# Patient Record
Sex: FEMALE | Race: WHITE | NOT HISPANIC OR LATINO | Employment: STUDENT | URBAN - METROPOLITAN AREA
[De-identification: names, ages, dates, MRNs, and addresses within clinical notes are randomized per-mention and may not be internally consistent; named-entity substitution may affect disease eponyms.]

---
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
## 2020-01-20 ENCOUNTER — OFFICE VISIT - HEALTHEAST (OUTPATIENT)
Dept: FAMILY MEDICINE | Facility: CLINIC | Age: 20
End: 2020-01-20

## 2020-01-20 ENCOUNTER — COMMUNICATION - HEALTHEAST (OUTPATIENT)
Dept: SCHEDULING | Facility: CLINIC | Age: 20
End: 2020-01-20

## 2020-01-20 DIAGNOSIS — R05.9 COUGH: ICD-10-CM

## 2020-01-20 DIAGNOSIS — L70.9 ACNE, UNSPECIFIED ACNE TYPE: ICD-10-CM

## 2020-02-22 ENCOUNTER — COMMUNICATION - HEALTHEAST (OUTPATIENT)
Dept: FAMILY MEDICINE | Facility: CLINIC | Age: 20
End: 2020-02-22

## 2020-03-31 ENCOUNTER — COMMUNICATION - HEALTHEAST (OUTPATIENT)
Dept: FAMILY MEDICINE | Facility: CLINIC | Age: 20
End: 2020-03-31

## 2020-03-31 DIAGNOSIS — Z30.015 ENCOUNTER FOR INITIAL PRESCRIPTION OF VAGINAL RING HORMONAL CONTRACEPTIVE: ICD-10-CM

## 2020-05-29 ENCOUNTER — COMMUNICATION - HEALTHEAST (OUTPATIENT)
Dept: FAMILY MEDICINE | Facility: CLINIC | Age: 20
End: 2020-05-29

## 2020-06-24 ENCOUNTER — OFFICE VISIT - HEALTHEAST (OUTPATIENT)
Dept: FAMILY MEDICINE | Facility: CLINIC | Age: 20
End: 2020-06-24

## 2020-06-24 ENCOUNTER — COMMUNICATION - HEALTHEAST (OUTPATIENT)
Dept: FAMILY MEDICINE | Facility: CLINIC | Age: 20
End: 2020-06-24

## 2020-06-24 DIAGNOSIS — H10.33 ACUTE BACTERIAL CONJUNCTIVITIS OF BOTH EYES: ICD-10-CM

## 2020-06-27 ENCOUNTER — COMMUNICATION - HEALTHEAST (OUTPATIENT)
Dept: FAMILY MEDICINE | Facility: CLINIC | Age: 20
End: 2020-06-27

## 2020-07-01 ENCOUNTER — COMMUNICATION - HEALTHEAST (OUTPATIENT)
Dept: FAMILY MEDICINE | Facility: CLINIC | Age: 20
End: 2020-07-01

## 2020-07-01 DIAGNOSIS — L70.9 ACNE: ICD-10-CM

## 2020-07-31 ENCOUNTER — COMMUNICATION - HEALTHEAST (OUTPATIENT)
Dept: FAMILY MEDICINE | Facility: CLINIC | Age: 20
End: 2020-07-31

## 2020-07-31 DIAGNOSIS — J30.9 ALLERGIC RHINITIS: ICD-10-CM

## 2020-08-24 ENCOUNTER — COMMUNICATION - HEALTHEAST (OUTPATIENT)
Dept: FAMILY MEDICINE | Facility: CLINIC | Age: 20
End: 2020-08-24

## 2020-09-23 ENCOUNTER — COMMUNICATION - HEALTHEAST (OUTPATIENT)
Dept: FAMILY MEDICINE | Facility: CLINIC | Age: 20
End: 2020-09-23

## 2020-09-23 DIAGNOSIS — L70.9 ACNE: ICD-10-CM

## 2020-11-27 ENCOUNTER — COMMUNICATION - HEALTHEAST (OUTPATIENT)
Dept: FAMILY MEDICINE | Facility: CLINIC | Age: 20
End: 2020-11-27

## 2020-11-27 DIAGNOSIS — R05.9 COUGH: ICD-10-CM

## 2020-12-24 ENCOUNTER — COMMUNICATION - HEALTHEAST (OUTPATIENT)
Dept: FAMILY MEDICINE | Facility: CLINIC | Age: 20
End: 2020-12-24

## 2021-01-22 ENCOUNTER — OFFICE VISIT (OUTPATIENT)
Dept: FAMILY MEDICINE | Facility: CLINIC | Age: 21
End: 2021-01-22
Payer: COMMERCIAL

## 2021-01-22 VITALS
HEIGHT: 63 IN | HEART RATE: 94 BPM | RESPIRATION RATE: 16 BRPM | SYSTOLIC BLOOD PRESSURE: 100 MMHG | WEIGHT: 129 LBS | OXYGEN SATURATION: 99 % | TEMPERATURE: 98 F | DIASTOLIC BLOOD PRESSURE: 68 MMHG | BODY MASS INDEX: 22.86 KG/M2

## 2021-01-22 DIAGNOSIS — Z23 NEED FOR PROPHYLACTIC VACCINATION AND INOCULATION AGAINST INFLUENZA: ICD-10-CM

## 2021-01-22 DIAGNOSIS — L70.9 ACNE, UNSPECIFIED ACNE TYPE: ICD-10-CM

## 2021-01-22 DIAGNOSIS — R06.02 SHORTNESS OF BREATH: Primary | ICD-10-CM

## 2021-01-22 PROCEDURE — 99214 OFFICE O/P EST MOD 30 MIN: CPT | Mod: 25 | Performed by: FAMILY MEDICINE

## 2021-01-22 PROCEDURE — 90686 IIV4 VACC NO PRSV 0.5 ML IM: CPT | Performed by: FAMILY MEDICINE

## 2021-01-22 PROCEDURE — 90471 IMMUNIZATION ADMIN: CPT | Performed by: FAMILY MEDICINE

## 2021-01-22 RX ORDER — TRETINOIN 0.5 MG/G
CREAM TOPICAL
COMMUNITY
Start: 2020-01-20 | End: 2022-04-01

## 2021-01-22 RX ORDER — ALBUTEROL SULFATE 90 UG/1
AEROSOL, METERED RESPIRATORY (INHALATION)
COMMUNITY
Start: 2020-12-05 | End: 2021-08-03

## 2021-01-22 RX ORDER — CLINDAMYCIN PHOSPHATE 10 UG/ML
LOTION TOPICAL
COMMUNITY
Start: 2020-11-01 | End: 2021-01-22

## 2021-01-22 RX ORDER — CLINDAMYCIN PHOSPHATE 10 UG/ML
LOTION TOPICAL
Qty: 60 ML | Refills: 1 | Status: SHIPPED | OUTPATIENT
Start: 2021-01-22 | End: 2021-06-08

## 2021-01-22 RX ORDER — FLUTICASONE PROPIONATE 50 MCG
SPRAY, SUSPENSION (ML) NASAL
COMMUNITY
Start: 2021-01-08 | End: 2021-11-22

## 2021-01-22 RX ORDER — ETONOGESTREL AND ETHINYL ESTRADIOL VAGINAL RING .015; .12 MG/D; MG/D
RING VAGINAL
COMMUNITY
Start: 2020-11-20 | End: 2021-12-27

## 2021-01-22 ASSESSMENT — ANXIETY QUESTIONNAIRES
IF YOU CHECKED OFF ANY PROBLEMS ON THIS QUESTIONNAIRE, HOW DIFFICULT HAVE THESE PROBLEMS MADE IT FOR YOU TO DO YOUR WORK, TAKE CARE OF THINGS AT HOME, OR GET ALONG WITH OTHER PEOPLE: SOMEWHAT DIFFICULT
5. BEING SO RESTLESS THAT IT IS HARD TO SIT STILL: SEVERAL DAYS
1. FEELING NERVOUS, ANXIOUS, OR ON EDGE: SEVERAL DAYS
GAD7 TOTAL SCORE: 7
3. WORRYING TOO MUCH ABOUT DIFFERENT THINGS: SEVERAL DAYS
2. NOT BEING ABLE TO STOP OR CONTROL WORRYING: SEVERAL DAYS
6. BECOMING EASILY ANNOYED OR IRRITABLE: SEVERAL DAYS
7. FEELING AFRAID AS IF SOMETHING AWFUL MIGHT HAPPEN: NOT AT ALL

## 2021-01-22 ASSESSMENT — PATIENT HEALTH QUESTIONNAIRE - PHQ9
SUM OF ALL RESPONSES TO PHQ QUESTIONS 1-9: 3
5. POOR APPETITE OR OVEREATING: MORE THAN HALF THE DAYS

## 2021-01-22 ASSESSMENT — MIFFLIN-ST. JEOR: SCORE: 1316.33

## 2021-01-22 NOTE — PROGRESS NOTES
Assessment/Plan:    Maye Fernandez is a 20 year old female presenting for:    Shortness of breath  Post Covid.  Is assumed that this will improve with time however I have sent Advair to the pharmacy.  I would like her to take this twice daily for 1 month.  After that she can discontinue and see how she is feeling.  Continue using albuterol as needed.  - fluticasone-salmeterol (ADVAIR) 250-50 MCG/DOSE inhaler  Dispense: 60 each; Refill: 1    Acne, unspecified acne type  Refill clindamycin sent to the pharmacy  - clindamycin (CLEOCIN T) 1 % external lotion  Dispense: 60 mL; Refill: 1    Need for prophylactic vaccination and inoculation against influenza  - INFLUENZA VACCINE IM > 6 MONTHS VALENT IIV4 [92299]      Medications Discontinued During This Encounter   Medication Reason     buPROPion (WELLBUTRIN XL) 150 MG 24 hr tablet      clindamycin (CLEOCIN T) 1 % external lotion Reorder           Chief Complaint:  Covid Concern and Imm/Inj        Subjective:   Maye Fernandez is a pleasant 20-year-old female presenting to the clinic today for follow-up of COVID-19.  Patient was diagnosed with COVID-19 in late November.  She states that she had moderate symptoms with some shortness of breath, body aches and headaches.  Symptoms lasted for about 5 days and abated.  Since that time she has been continuing to have some shortness of breath.  She notes that occasionally she feels as though she is wheezing.  She is decreased exercise tolerance as well.  She denies any chest pain or lower extremity swelling.    She is eating and drinking well.  She is sleeping well.    She notes that she has been having some increased anxiety recently.      12 point review of systems completed and negative except for what has been described above    History   Smoking Status     Never Smoker   Smokeless Tobacco     Never Used         Current Outpatient Medications:      clindamycin (CLEOCIN T) 1 % external lotion, ADILENE EXT TO FACE BID,  "Disp: 60 mL, Rfl: 1     etonogestrel-ethinyl estradiol (NUVARING) 0.12-0.015 MG/24HR vaginal ring, , Disp: , Rfl:      fluticasone (FLONASE) 50 MCG/ACT nasal spray, , Disp: , Rfl:      fluticasone-salmeterol (ADVAIR) 250-50 MCG/DOSE inhaler, Inhale 1 puff into the lungs every 12 hours, Disp: 60 each, Rfl: 1     sulfacetamide sodium-sulfur 10-5 % EMUL, One application daily, Disp: , Rfl:      tretinoin (RETIN-A) 0.05 % external cream, , Disp: , Rfl:      VENTOLIN  (90 Base) MCG/ACT inhaler, INL 2 PFS PO Q 6 H PRF WHZ, Disp: , Rfl:       Objective:  Vitals:    01/22/21 1347   BP: 100/68   Pulse: 94   Resp: 16   Temp: 98  F (36.7  C)   TempSrc: Tympanic   SpO2: 99%   Weight: 58.5 kg (129 lb)   Height: 1.588 m (5' 2.5\")       Body mass index is 23.22 kg/m .    Vital signs reviewed and stable  General: No acute distress  Psych: Appropriate affect  HEENT: moist mucous membranes, pupils equal, round, reactive to light and accomodation, tympanic membranes are pearly grey bilaterally  Lymph: no cervical or supraclavicular lymphadenopathy  Cardiovascular: regular rate and rhythm with no murmur  Pulmonary: clear to auscultation bilaterally with no wheeze  Abdomen: soft, non tender, non distended with normo-active bowel sounds  Extremities: warm and well perfused with no edema  Skin: warm and dry with no rash         This note has been dictated and transcribed using voice recognition software.   Any errors in transcription are unintentional and inherent to the software.  "

## 2021-01-23 ASSESSMENT — ANXIETY QUESTIONNAIRES: GAD7 TOTAL SCORE: 7

## 2021-01-31 ENCOUNTER — COMMUNICATION - HEALTHEAST (OUTPATIENT)
Dept: FAMILY MEDICINE | Facility: CLINIC | Age: 21
End: 2021-01-31

## 2021-01-31 DIAGNOSIS — L70.9 ACNE, UNSPECIFIED ACNE TYPE: ICD-10-CM

## 2021-02-01 ENCOUNTER — COMMUNICATION - HEALTHEAST (OUTPATIENT)
Dept: FAMILY MEDICINE | Facility: CLINIC | Age: 21
End: 2021-02-01

## 2021-02-01 DIAGNOSIS — Z30.015 ENCOUNTER FOR INITIAL PRESCRIPTION OF VAGINAL RING HORMONAL CONTRACEPTIVE: ICD-10-CM

## 2021-02-28 ENCOUNTER — HEALTH MAINTENANCE LETTER (OUTPATIENT)
Age: 21
End: 2021-02-28

## 2021-02-28 ASSESSMENT — ENCOUNTER SYMPTOMS
COUGH: 0
DIARRHEA: 0
EYE PAIN: 0
PARESTHESIAS: 0
NAUSEA: 0
SORE THROAT: 0
HEARTBURN: 0
NERVOUS/ANXIOUS: 0
SHORTNESS OF BREATH: 0
ABDOMINAL PAIN: 0
WEAKNESS: 0
HEADACHES: 0
HEMATURIA: 0
JOINT SWELLING: 0
DIZZINESS: 0
BREAST MASS: 0
MYALGIAS: 0
HEMATOCHEZIA: 0
FEVER: 0
ARTHRALGIAS: 0
CONSTIPATION: 0
FREQUENCY: 0
DYSURIA: 0
CHILLS: 0
PALPITATIONS: 0

## 2021-03-01 ENCOUNTER — ANCILLARY PROCEDURE (OUTPATIENT)
Dept: GENERAL RADIOLOGY | Facility: CLINIC | Age: 21
End: 2021-03-01
Attending: FAMILY MEDICINE
Payer: COMMERCIAL

## 2021-03-01 ENCOUNTER — OFFICE VISIT (OUTPATIENT)
Dept: FAMILY MEDICINE | Facility: CLINIC | Age: 21
End: 2021-03-01
Payer: COMMERCIAL

## 2021-03-01 VITALS
HEIGHT: 63 IN | SYSTOLIC BLOOD PRESSURE: 122 MMHG | BODY MASS INDEX: 23.59 KG/M2 | TEMPERATURE: 99.3 F | RESPIRATION RATE: 16 BRPM | DIASTOLIC BLOOD PRESSURE: 78 MMHG | HEART RATE: 76 BPM | WEIGHT: 133.13 LBS

## 2021-03-01 DIAGNOSIS — M79.675 PAIN OF TOE OF LEFT FOOT: ICD-10-CM

## 2021-03-01 DIAGNOSIS — B37.0 THRUSH: ICD-10-CM

## 2021-03-01 DIAGNOSIS — R06.02 SHORTNESS OF BREATH: ICD-10-CM

## 2021-03-01 DIAGNOSIS — Z86.16 HISTORY OF COVID-19: ICD-10-CM

## 2021-03-01 DIAGNOSIS — Z00.00 HEALTHCARE MAINTENANCE: Primary | ICD-10-CM

## 2021-03-01 PROCEDURE — 71046 X-RAY EXAM CHEST 2 VIEWS: CPT | Mod: FY | Performed by: RADIOLOGY

## 2021-03-01 PROCEDURE — 99213 OFFICE O/P EST LOW 20 MIN: CPT | Mod: 25 | Performed by: FAMILY MEDICINE

## 2021-03-01 PROCEDURE — 99395 PREV VISIT EST AGE 18-39: CPT | Performed by: FAMILY MEDICINE

## 2021-03-01 PROCEDURE — G0145 SCR C/V CYTO,THINLAYER,RESCR: HCPCS | Performed by: FAMILY MEDICINE

## 2021-03-01 RX ORDER — NYSTATIN 100000/ML
500000 SUSPENSION, ORAL (FINAL DOSE FORM) ORAL 4 TIMES DAILY
Qty: 60 ML | Refills: 0 | Status: SHIPPED | OUTPATIENT
Start: 2021-03-01 | End: 2021-12-02

## 2021-03-01 ASSESSMENT — MIFFLIN-ST. JEOR: SCORE: 1330.04

## 2021-03-01 NOTE — PROGRESS NOTES
Assessment/Plan:     Health maintenance female exam.  All questions answered.  Await pap smear results.  Breast self exam technique reviewed and patient encouraged to perform self-exam monthly.  Discussed healthy lifestyle modifications.      Healthcare maintenance  - Obtaining, preparing and conveyance of cervical or vaginal smear to laboratory.  - Pap imaged thin layer screen only - recommended age 21 - 24 years    History of COVID-19  With her history of COVID-19 several months ago and continued shortness of breath I will get an x-ray today to ensure that we are not missing anything.  She did benefit from Advair some could consider that if she would like.  - XR Chest 2 Views    Shortness of breath  - XR Chest 2 Views    Thrush  Unclear if this is the reason she is having a foul taste in her mouth.  Nystatin sent to the pharmacy which she can do swish and swallow.  - nystatin (MYCOSTATIN) 935015 UNIT/ML suspension  Dispense: 60 mL; Refill: 0    Pain of toe of left foot  This was not fully evaluated today due to time constraints.  Referral to podiatry placed.  - [facility]  Referral              Subjective:     Maye Fernandez is a 21 year old female who presents for an annual exam.  She is overall doing fairly well.  She has a few questions or concerns she would like to discuss today.    1.  Shortness of breath: Patient has a history of COVID-19 several months ago.  She lost her taste and smell when she had this and also had some shortness of breath.  She noticed that when she exerts herself she will feel short of breath still.  She was started on Advair which she states was helpful.  She used this for 30 days now has been off of it for a few days.  She is unsure if she is becoming more short of breath being off the medication or not.  She does have albuterol which she uses occasionally as well.      2. Taste disturbance : Patient is unsure if this is due to COVID-19 but she states that  her taste was coming back.  Everything seems somewhat blunted but now she states that everything taste foul.  She states that this is a new symptom over the last few days.  She is having difficult time eating because things do not taste good.    #3.  Toe pain: Patient notes that she believes she broke her left great toe when she was much younger.  It has been painful for her recently.  She is hopeful to see a specialist for this.      Healthy Habits:   Regular Exercise: Yes  Sunscreen Use: Yes  Healthy Diet: yes  Dental Visits Regularly: yes  Seat Belt: Yes  Self Breast Exam Monthly: yes  Prevention of Osteoporosis: yes    Immunization History   Administered Date(s) Administered     Comvax (HIB/HepB) 2000, 2000, 02/14/2001     DTAP (<7y) 2000, 2000, 2000, 05/11/2001, 02/02/2005     HPV9 02/11/2019     HepA-Adult 03/10/2011, 09/14/2011     Influenza Vaccine IM > 6 months Valent IIV4 01/22/2021     MMR 02/14/2001, 02/02/2005     Meningococcal (Menactra ) 03/10/2011     Pneumococcal (PCV 7) 2000, 2000, 2000, 05/11/2001     Poliovirus, inactivated (IPV) 2000, 2000, 2000, 2000, 02/24/2004     TDAP Vaccine (Boostrix) 03/10/2011     Varicella 02/14/2001, 03/10/2011         Gynecologic History  Patient's last menstrual period was 02/21/2021 (exact date).  Contraception: Nuva ring  Last Pap: n/a.        OB History   No obstetric history on file.       Current Outpatient Medications   Medication Sig Dispense Refill     clindamycin (CLEOCIN T) 1 % external lotion ADILENE EXT TO FACE BID 60 mL 1     etonogestrel-ethinyl estradiol (NUVARING) 0.12-0.015 MG/24HR vaginal ring        fluticasone (FLONASE) 50 MCG/ACT nasal spray        nystatin (MYCOSTATIN) 332781 UNIT/ML suspension Take 5 mLs (500,000 Units) by mouth 4 times daily Swish and spit OK 60 mL 0     sulfacetamide sodium-sulfur 10-5 % EMUL One application daily       tretinoin (RETIN-A) 0.05 % external  "cream        VENTOLIN  (90 Base) MCG/ACT inhaler INL 2 PFS PO Q 6 H PRF WHZ       Past Medical History:   Diagnosis Date     Deliberate self-cutting      No past surgical history on file.  Patient has no known allergies.  No family history on file.  Social History     Socioeconomic History     Marital status: Single     Spouse name: Not on file     Number of children: Not on file     Years of education: Not on file     Highest education level: Not on file   Occupational History     Not on file   Social Needs     Financial resource strain: Not on file     Food insecurity     Worry: Not on file     Inability: Not on file     Transportation needs     Medical: Not on file     Non-medical: Not on file   Tobacco Use     Smoking status: Never Smoker     Smokeless tobacco: Never Used   Substance and Sexual Activity     Alcohol use: No     Drug use: Not on file     Sexual activity: Not on file   Lifestyle     Physical activity     Days per week: Not on file     Minutes per session: Not on file     Stress: Not on file   Relationships     Social connections     Talks on phone: Not on file     Gets together: Not on file     Attends [employer] service: Not on file     Active member of club or organization: Not on file     Attends meetings of clubs or organizations: Not on file     Relationship status: Not on file     Intimate partner violence     Fear of current or ex partner: Not on file     Emotionally abused: Not on file     Physically abused: Not on file     Forced sexual activity: Not on file   Other Topics Concern     Not on file   Social History Narrative     Not on file       Review of Systems  12 point review of systems was completed and found to be negative except for what is been stated above.      Objective:      Vitals:    03/01/21 1325   BP: 122/78   Pulse: 76   Resp: 16   Temp: 99.3  F (37.4  C)   TempSrc: Tympanic   Weight: 60.4 kg (133 lb 2 oz)   Height: 1.588 m (5' 2.5\")         Physical Exam:  General " Appearance: Alert, cooperative, no distress, appears stated age   Head: Normocephalic, without obvious abnormality, atraumatic  Eyes: PERRL, conjunctiva/corneas clear, EOM's intact   Ears: Normal TM's and external ear canals, both ears  Nose:Nares normal, septum midline,mucosa normal, no drainage    Throat:Lips, mucosa, and tongue normal; thin white coating over tongue teeth and gums normal  Neck: Supple, symmetrical, trachea midline, no adenopathy;  thyroid: not enlarged, symmetric, no tenderness/mass/nodules  Back: Symmetric, no curvature, ROM normal,  Lungs: Clear to auscultation bilaterally, respirations unlabored  Breasts: No breast masses, tenderness, asymmetry, or nipple discharge.  Heart: Regular rate and rhythm, S1 and S2 normal, no murmur, rub, or gallop  Abdomen: Soft, non-tender, bowel sounds active all four quadrants,  no masses, no organomegaly  Pelvic:normal external female genitalia, normal appearing vaginal mucosa and cervix  Extremities: Extremities normal, atraumatic, no cyanosis or edema  Skin: Skin color, texture, turgor normal, no rashes or lesions  Lymph nodes: Cervical, supraclavicular, and axillary nodes normal and   Neurologic: Normal       Answers for HPI/ROS submitted by the patient on 2/28/2021   Annual Exam:  Frequency of exercise:: None  Getting at least 3 servings of Calcium per day:: Yes  Diet:: Regular (no restrictions)  Taking medications regularly:: Yes  Medication side effects:: None  Bi-annual eye exam:: NO  Dental care twice a year:: NO  Sleep apnea or symptoms of sleep apnea:: None  abdominal pain: No  Blood in stool: No  Blood in urine: No  chest pain: No  chills: No  congestion: No  constipation: No  cough: No  diarrhea: No  dizziness: No  ear pain: No  eye pain: No  nervous/anxious: No  fever: No  frequency: No  genital sores: No  headaches: No  hearing loss: No  heartburn: No  arthralgias: No  joint swelling: No  peripheral edema: No  mood changes: No  myalgias:  No  nausea: No  dysuria: No  palpitations: No  Skin sensation changes: No  sore throat: No  urgency: No  rash: No  shortness of breath: No  visual disturbance: No  weakness: No  pelvic pain: No  vaginal bleeding: No  vaginal discharge: No  tenderness: No  breast mass: No  breast discharge: No  Additional concerns today:: Yes

## 2021-03-03 LAB
COPATH REPORT: NORMAL
PAP: NORMAL

## 2021-03-08 ENCOUNTER — OFFICE VISIT (OUTPATIENT)
Dept: PODIATRY | Facility: CLINIC | Age: 21
End: 2021-03-08
Payer: COMMERCIAL

## 2021-03-08 ENCOUNTER — ANCILLARY PROCEDURE (OUTPATIENT)
Dept: GENERAL RADIOLOGY | Facility: CLINIC | Age: 21
End: 2021-03-08
Attending: PODIATRIST
Payer: COMMERCIAL

## 2021-03-08 DIAGNOSIS — M79.672 LEFT FOOT PAIN: Primary | ICD-10-CM

## 2021-03-08 DIAGNOSIS — M79.672 LEFT FOOT PAIN: ICD-10-CM

## 2021-03-08 DIAGNOSIS — M77.52 CAPSULITIS OF METATARSOPHALANGEAL (MTP) JOINT OF LEFT FOOT: ICD-10-CM

## 2021-03-08 PROCEDURE — 99203 OFFICE O/P NEW LOW 30 MIN: CPT | Performed by: PODIATRIST

## 2021-03-08 PROCEDURE — 73630 X-RAY EXAM OF FOOT: CPT | Mod: LT | Performed by: RADIOLOGY

## 2021-03-08 NOTE — PATIENT INSTRUCTIONS
Next Steps:      1. Support:  a. Wear supportive shoes, sandals, boots and/or inserts that have a rigid supportive sole.    i. This will offload the majority of tension forces that travel through your feet every step you take.    b. It is important that you also wear supportive shoe wear in the house to continue providing support to your feet.    c. You may always use a cushioned liner for your shoes if that makes your feet feel better.  2. Stretching  a. Calf stretching is essential to offload the tension forces that travel through your feet every step you take  b. Preferred calf stretch is the Runner's Stretch  i. Place one foot behind the other foot, flat against the ground (it is important to keep the heel on the ground).  The back leg is the one that will be stretched.  1. Start with the knee straight and lean your hips into the wall, counter or whatever you are leaning into - count to ten.  2. Next, bend the knee.  You should feel the stretch lower in the calf muscle - count to ten.  c. Repeat this stretch once an hour to start off with.  When symptoms subside, I recommend performing the stretch 3 times daily to prevent any future problems.  3. Tissue Massage  a. It is important that you physically loosen the inflammation tissue to help your body heal the injured tissue.  b. I recommend soaking your foot in warm water to increase the microcirculation to the soft tissues.  You may add Epson salt to the water if you prefer.  c. You may apply an over-the-counter muscle rub, such as Voltaren gel, and deeply massage the injured tissue.  4. Reduce Inflammation  a. You can ice the injured tissue with an ice pack with a light cloth covering or soaking in ice water 20 minutes to reduce any acute inflammation, typically at the end of the day.  b. If tolerated, you may take a Non-Steroidal Antiinflammatory medication (NSAID), such as Advil or Aleve, to help reduce the inflammation tissue.  This can help the overall  healing of the injured tissue.  i. It is important to take food with any NSAID medication as the most common side effect is stomach irritation.  If you encounter any problems when taking NSAID, it is recommended that you stop taking the medication and notify your provider.    It is important to understand that most problems that develop in the foot and ankle are caused by excessive tension that cause microinjury to the soft tissues and inflammation in the foot and ankle.  By addressing the underlying causes with support and stretching as well as treating the current inflammatory conditions with tissue massage and anti-inflammatory treatments, most foot and ankle musculoskeletal conditions will resolve.  This may take time to heal.  However, if symptoms persist past 4 weeks you should return to the office for reevaluation to determine further treatment options.

## 2021-03-08 NOTE — LETTER
3/8/2021         RE: Maye Fernandez  [id number] [facility] [id number]        Dear Colleague,    Thank you for referring your patient, Maye Fernandez, to the [facility] [place]. Please see a copy of my visit note below.    PATIENT HISTORY:  Maye Fernandez is a 21 year old female who presents to clinic with a chief complaint of a painful ball of the left foot.  The patient relates that the problem has been going on for several weeks and is getting worse.  The patient relates trying different shoes with little relief.   The patient was referred by Dr. Amezquita for consultation on the left foot.  Any previous notes and studies that pertain to the patient's condition were reviewed.    Pertinent medical, surgical and family history was reviewed in Epic chart.    Medications:   Current Outpatient Medications:      clindamycin (CLEOCIN T) 1 % external lotion, ADILENE EXT TO FACE BID, Disp: 60 mL, Rfl: 1     etonogestrel-ethinyl estradiol (NUVARING) 0.12-0.015 MG/24HR vaginal ring, , Disp: , Rfl:      fluticasone (FLONASE) 50 MCG/ACT nasal spray, , Disp: , Rfl:      nystatin (MYCOSTATIN) [id number] UNIT/ML suspension, Take 5 mLs (500,000 Units) by mouth 4 times daily Swish and spit OK, Disp: 60 mL, Rfl: 0     sulfacetamide sodium-sulfur 10-5 % EMUL, One application daily, Disp: , Rfl:      tretinoin (RETIN-A) 0.05 % external cream, , Disp: , Rfl:      VENTOLIN  (90 Base) MCG/ACT inhaler, INL 2 PFS PO Q 6 H PRF WHZ, Disp: , Rfl:      Allergies:  No Known Allergies    Vitals: [facility] 02/21/2021 (Exact Date)   BMI= There is no height or weight on file to calculate BMI.    LOWER EXTREMITY PHYSICAL EXAM    Dermatologic: Skin is intact to left lower extremities without significant lesions, rash or abrasion.        Vascular: DP & PT pulses are intact & regular on the left.   CFT and skin temperature is normal to the left lower extremities.     Neurologic: Lower extremity sensation is  intact to light touch.  No evidence of weakness in the left lower extremities.        Musculoskeletal: Patient is ambulatory without assistive device or brace.  No gross ankle deformity noted.  No foot or ankle joint effusion is noted.  Noted pain on palpation on the plantar aspect of the metatarsophalangeal joint on the left foot.  No surrounding erythema noted.  No ecchymosis noted.    Diagnostics:  Radiographs were evaluated including weightbearing AP, lateral and medial oblique views of the left foot reveals  no cortical erosions or periosteal elevation.  All joint margins appear stable.  There is no apparent fracture or tumor formation noted.  There is no evidence of foreign body.  The images were reviewed with the patient explaining the findings.      ASSESSMENT / PLAN:     ICD-10-CM    1. Left foot pain  M79.672 XR Foot Left G/E 3 Views       I have explained to Maye about the conditions.  We discussed the underlying contributing factors of the condition as well as both conservative and surgical treatment options along with expected length of recovery.  At this time, the patient was instructed on icing, stretching, tissue massage and support.  The patient was fitted with a Dynaflex insert that will aid in offloading the tension forces to the soft tissues and prevent further inflammation.    The patient will return in four weeks for reevaluation if the symptoms do not resolve.      Maye verbalized agreement with and understanding of the rational for the diagnosis and treatment plan.  All questions were answered to best of my ability and the patient's satisfaction. The patient was advised to contact the clinic with any questions that may arise after the clinic visit.      Disclaimer: This note consists of symbols derived from keyboarding, dictation and/or voice recognition software. As a result, there may be errors in the script that have gone undetected. Please consider this when interpreting  information found in this chart.       ADRIANA Amador D.P.M., [email or web address].        Again, thank you for allowing me to participate in the care of your patient.        Sincerely,        Carrillo Amador DPM

## 2021-05-28 NOTE — TELEPHONE ENCOUNTER
Refill Approved    Rx renewed per Medication Renewal Policy. Medication was last renewed on 9/14/17.    Ashley García, [facility] Triage/Med Refill 5/14/2019     Requested Prescriptions   Pending Prescriptions Disp Refills     fluticasone propionate (FLONASE) 50 mcg/actuation nasal spray 16 g 0     Sig: USE 2 SPRAYS IN EACH NOSTRIL ONCE DAILY       Nasal Steroid Refill Protocol Passed - 5/13/2019 10:42 AM        Passed - Patient has had office visit/physical in last 2 years     Last office visit with prescriber/PCP: 2/11/2019 OR same dept: 2/11/2019 Lashonda Amezquita MD OR same specialty: 2/11/2019 Lashonda Amezquita MD Last physical: Visit date not found Last MTM visit: Visit date not found    Next appt within 3 mo: Visit date not found  Next physical within 3 mo: Visit date not found  Prescriber OR PCP: Lashonda Amezquita MD  Last diagnosis associated with med order: 1. Allergic rhinitis  - fluticasone propionate (FLONASE) 50 mcg/actuation nasal spray; USE 2 SPRAYS IN EACH NOSTRIL ONCE DAILY  Dispense: 16 g; Refill: 0     If protocol passes may refill for 12 months if within 3 months of last provider visit (or a total of 15 months).

## 2021-05-30 VITALS — BODY MASS INDEX: 21.75 KG/M2 | WEIGHT: 118.19 LBS | HEIGHT: 62 IN

## 2021-05-30 NOTE — TELEPHONE ENCOUNTER
RN cannot approve Refill Request    RN can NOT refill this medication med is not covered by policy/route to provider       . Last office visit: 2/11/2019 Lashonda Amezquita MD Last Physical: Visit date not found Last MTM visit: Visit date not found Last visit same specialty: 2/11/2019 Lashonda Amezquita MD.  Next visit within 3 mo: Visit date not found  Next physical within 3 mo: Visit date not found      Ashley García, [facility] Triage/Med Refill 7/30/2019    Requested Prescriptions   Pending Prescriptions Disp Refills     sulfacetamide sodium-sulfur 10-5 % (w/w) Clsr 340 g 1     Sig: One application daily       There is no refill protocol information for this order

## 2021-05-31 VITALS — BODY MASS INDEX: 22.15 KG/M2 | WEIGHT: 120.38 LBS | HEIGHT: 62 IN

## 2021-05-31 VITALS — WEIGHT: 113.38 LBS | BODY MASS INDEX: 20.09 KG/M2 | HEIGHT: 63 IN

## 2021-06-02 VITALS — HEIGHT: 63 IN | BODY MASS INDEX: 22.05 KG/M2 | WEIGHT: 124.44 LBS

## 2021-06-02 NOTE — TELEPHONE ENCOUNTER
RN cannot approve Refill Request    RN can NOT refill this medication med is not covered by policy/route to provider     . Last office visit: 2/11/2019 Lashonda Amezquita MD Last Physical: Visit date not found Last MTM visit: Visit date not found Last visit same specialty: 2/11/2019 Lashonda Amezquita MD.  Next visit within 3 mo: Visit date not found  Next physical within 3 mo: Visit date not found      Ashley García, [facility] Triage/Med Refill 10/24/2019    Requested Prescriptions   Pending Prescriptions Disp Refills     clindamycin (CLEOCIN T) 1 % lotion 60 mL 2     Sig: APPLY EXTERNALLY TO FACE EVERY DAY       There is no refill protocol information for this order

## 2021-06-04 VITALS
HEART RATE: 72 BPM | DIASTOLIC BLOOD PRESSURE: 72 MMHG | RESPIRATION RATE: 16 BRPM | OXYGEN SATURATION: 100 % | TEMPERATURE: 97.9 F | SYSTOLIC BLOOD PRESSURE: 110 MMHG | WEIGHT: 119.8 LBS | BODY MASS INDEX: 21.56 KG/M2

## 2021-06-05 NOTE — TELEPHONE ENCOUNTER
Had a cold since Christmas.  Started with flu and now st.ha, chest and sinus congestion.    Upper neck lymph nodes sore.    Able to swallow.    No fever.    Wants to be seen.    Transferred to scheduling for an appointment.    Devika Moreno RN  Triage Nurse Advisor

## 2021-06-05 NOTE — PROGRESS NOTES
Assessment/ Plan     1. Cough  Acute sinusitis with postnasal drainage    A chest x-ray is negative for acute infiltrate.  This was personally reviewed and will be reviewed by radiology  Reviewed her clinical history and over-the-counter treatment therapies  Given acute sinusitis symptoms greater than 10 days and not responsive to evaluate treatment will treat with Augmentin  Reviewed potential side effects  Recommend use of Mucinex as needed  Encourage fluids and rest  Follow-up if not improving  - XR Chest 2 Views   - amoxicillin-clavulanate (AUGMENTIN) 875-125 mg per tablet; Take 1 tablet by mouth 2 (two) times a day for 10 days.  Dispense: 20 tablet; Refill: 0    2. Acne, unspecified acne type    Refilled Retin-A  Continue with topical benzoyl peroxide    - tretinoin (RETIN-A) 0.05 % cream; Apply topically at bedtime.  Dispense: 20 g; Refill: 0      Subjective:       Maye GARCIA Jim is a 19 y.o. female, patient of Dr. Amezquita, who presents to the clinic with greater than a 4-week history of a constellation of symptoms.  She states that shortly following [facility] visit she developed a constellation of symptoms including muscle aches and pains, severe cough, and fever which she believes may have been influenza.  Her symptoms were severe for approximately 5 days and then improved.  She has had ongoing congestion since then.  Over the past 10 days she has had an exacerbation of her symptoms now is having pressure in her forehead and behind her nose.  She has had a thick nasal discharge and feels mildly short of breath.  She has had a cough as well.  She can have a sore throat at night.  Over-the-counter medications have not been helpful.  Her energy level has been quite low.  She has not been improving.    Also, she has followed up with dermatology previously.  She would like a refill of her Retin-A which has been effective for her.    The following portions of the patient's history were reviewed and updated  as appropriate: allergies, current medications, past family history, past medical history, past social history, past surgical history and problem list. Medications have been reconciled    Review of Systems   A 12 point comprehensive review of systems was negative except as noted.      Current Outpatient Medications   Medication Sig Dispense Refill     clindamycin (CLEOCIN T) 1 % lotion APPLY EXTERNALLY TO FACE EVERY DAY 60 mL 2     etonogestrel-ethinyl estradiol (NUVARING) 0.12-0.015 mg/24 hr vaginal ring Insert 1 each into the vagina every 21 days. Insert 1 ring vaginally and leave in place for 3 weeks, then remove for one 1 week. 3 each 0     fluticasone propionate (FLONASE) 50 mcg/actuation nasal spray USE 2 SPRAYS IN EACH NOSTRIL ONCE DAILY 48 g 3     sulfacetamide sodium-sulfur 10-5 % (w/w) Clsr One application daily 340 g 1     amoxicillin-clavulanate (AUGMENTIN) 875-125 mg per tablet Take 1 tablet by mouth 2 (two) times a day for 10 days. 20 tablet 0     tretinoin (RETIN-A) 0.05 % cream Apply topically at bedtime. 20 g 0     No current facility-administered medications for this visit.        Objective:      /72   Pulse 72   Temp 97.9  F (36.6  C) (Oral)   Resp 16   Wt 119 lb 12.8 oz (54.3 kg)   LMP 01/20/2020 (Exact Date)   SpO2 100%   BMI 21.56 kg/m        General appearance: alert, appears stated age and cooperative  Head: Normocephalic, without obvious abnormality, atraumatic  Eyes: conjunctivae/corneas clear. PERRL, EOM's intact.   Ears: normal TM's and external ear canals both ears  Nose: Nares normal. Septum midline. Mucosa normal. No drainage or sinus tenderness.  Throat: lips, mucosa, and tongue normal; teeth and gums normal  Oropharynx moderately erythematous  Neck: no adenopathy, supple, symmetrical, trachea midline   Lungs: clear to auscultation bilaterally  Heart: regular rate and rhythm, S1, S2 normal, no murmur, click, rub or gallop  Extremities: extremities normal, atraumatic, no  cyanosis or edema  Skin: Skin color, texture, turgor normal. No rashes or lesions  Lymph nodes: Cervical nodes normal.  Neurologic: Alert and oriented X 3.         No results found for this or any previous visit (from the past 168 hour(s)).       This note has been dictated using voice recognition software. Any grammatical or context distortions are unintentional and inherent to the software

## 2021-06-06 NOTE — TELEPHONE ENCOUNTER
You can help her schedule for an appointment for tomorrow if she would like to be seen.  If she would like to be treated for a sinus infection she can do an E visit if she would like.    FRANTZ

## 2021-06-06 NOTE — TELEPHONE ENCOUNTER
Dr. Amezquita-  See [employer] message.  Last seen by SPARKLE on 1/20/20.  See again for another appt?

## 2021-06-08 DIAGNOSIS — L70.9 ACNE, UNSPECIFIED ACNE TYPE: ICD-10-CM

## 2021-06-08 RX ORDER — CLINDAMYCIN PHOSPHATE 10 UG/ML
LOTION TOPICAL
Qty: 60 ML | Refills: 1 | Status: SHIPPED | OUTPATIENT
Start: 2021-06-08 | End: 2021-11-22

## 2021-06-08 NOTE — TELEPHONE ENCOUNTER
Routing refill request to provider for review/approval because:  Drug not on the [facility] refill protocol   Thank you.  Osman Horowitz RN

## 2021-06-09 NOTE — PROGRESS NOTES
"Maye Fernandez is a 20 y.o. female who is being evaluated via a billable video visit.      The patient has been notified of following:     \"This video visit will be conducted via a call between you and your physician/provider. We have found that certain health care needs can be provided without the need for an in-person physical exam.  This service lets us provide the care you need with a video conversation.  If a prescription is necessary we can send it directly to your pharmacy.  If lab work is needed we can place an order for that and you can then stop by our lab to have the test done at a later time.    Video visits are billed at different rates depending on your insurance coverage. Please reach out to your insurance provider with any questions.    If during the course of the call the physician/provider feels a video visit is not appropriate, you will not be charged for this service.\"    Patient has given verbal consent to a Video visit? Yes    Will anyone else be joining your video visit? No        Video Start Time: 1:41 PM    -------------------------    Assessment/Plan:    Maye Fernandez is a 20 y.o. female presenting for:    1. Acute bacterial conjunctivitis of both eyes  It seems as though her symptoms are most likely related to allergies however because she is so far from the nearest pharmacy I will send both an allergy eyedrop as well as an antibiotic eyedrop.  She will contact me and let me know how she is doing over the next few days.  She will continue using her Flonase.  - polymyxin B-trimethoprim (FOR POLYTRIM) 10,000 unit- 1 mg/mL Drop ophthalmic drops; 1-2 drops to the affected eye(s) 4 (four) times a day for 7 days  Dispense: 1 Bottle; Refill: 0  - azelastine (OPTIVAR) 0.05 % ophthalmic solution; Administer 1 drop to both eyes 2 (two) times a day.  Dispense: 6 mL; Refill: 2        Medications Discontinued During This Encounter   Medication Reason     polymyxin B-trimethoprim (FOR " POLYTRIM) 10,000 unit- 1 mg/mL Drop ophthalmic drops Reorder     azelastine (OPTIVAR) 0.05 % ophthalmic solution Reorder           Chief Complaint:  Chief Complaint   Patient presents with     Itchy Eye       Subjective:   Maye Fernandez is a pleasant 20 y.o. female being evaluated via video visit today for the following concern/s:     She has been having itchy watery crusty eyes for the last 2 days.  She has not been sick otherwise.  She does have seasonal allergies and takes Flonase.  She states that her other allergy symptoms have not been that bothersome for her.  She has been using over-the-counter eyedrops which helped temporarily.  No vision issues.  She has had crusting and redness in her both of her eyes.      12 point review of systems completed and negative except for what has been described above    Social History     Tobacco Use   Smoking Status Never Smoker   Smokeless Tobacco Never Used       Current Outpatient Medications   Medication Sig     clindamycin (CLEOCIN T) 1 % lotion APPLY EXTERNALLY TO FACE EVERY DAY     etonogestreL-ethinyl estradioL (NUVARING) 0.12-0.015 mg/24 hr vaginal ring Insert 1 each into the vagina every 21 days. Insert 1 ring vaginally and leave in place for 3 weeks, then remove for one 1 week.     fluticasone propionate (FLONASE) 50 mcg/actuation nasal spray USE 2 SPRAYS IN EACH NOSTRIL ONCE DAILY     sulfacetamide sodium-sulfur 10-5 % (w/w) Clsr One application daily     tretinoin (RETIN-A) 0.05 % cream Apply topically at bedtime.     azelastine (OPTIVAR) 0.05 % ophthalmic solution Administer 1 drop to both eyes 2 (two) times a day.     polymyxin B-trimethoprim (FOR POLYTRIM) 10,000 unit- 1 mg/mL Drop ophthalmic drops 1-2 drops to the affected eye(s) 4 (four) times a day for 7 days         Objective:  There were no vitals filed for this visit.    There is no height or weight on file to calculate BMI.    General: No acute distress  Psych: Appropriate affect  HEENT: moist  mucous membranes  Pulmonary: Breathing comfortably, speaking in complete sentences  Extremities: warm and well perfused with no edema  Skin: warm and dry with no rash       This note has been dictated and transcribed using voice recognition software.   Any errors in transcription are unintentional and inherent to the software.        Video-Visit Details    Type of service:  Video Visit    Video End Time (time video stopped): 1:55 PM  Originating Location (pt. Location): Home    Distant Location (provider location):  [facility] MEDICINE/OB     Platform used for Video Visit: [employer] [phone]      Lashonda Amezquita MD

## 2021-06-10 NOTE — PROGRESS NOTES
Assessment/Plan:    Maye Fernandez is a 17 y.o. female presenting for:    1. Depression  Because she seems to be doing well on Celexa she will continue on her current dose.  I did discuss that we could certainly increase the dose at some point if she would like and she will keep that in mind and let me know.  Because she did not tolerate the Wellbutrin well and will take this off of her med list today.  She seems like she is overall doing fairly well.  They will let me know if they have any further concerns.  - citalopram (CELEXA) 10 MG tablet; Take 1 tablet (10 mg total) by mouth daily.  Dispense: 30 tablet; Refill: 2        Medications Discontinued During This Encounter   Medication Reason     sertraline (ZOLOFT) 50 MG tablet Therapy completed     buPROPion (WELLBUTRIN XL) 300 MG 24 hr tablet Therapy completed     fluticasone (FLONASE) 50 mcg/actuation nasal spray Therapy completed     traZODone (DESYREL) 50 MG tablet Therapy completed     citalopram (CELEXA) 10 MG tablet Reorder           Chief Complaint:  Chief Complaint   Patient presents with     Medication Education Visit       Subjective:   Maye Fernandez is a very pleasant 17-year-old female presenting to the clinic today for a follow-up of depression.  The patient has a past medical history significant for depression/anxiety.  She has been seen in the past at the [facility] by the pediatrician there.  She had been on Zoloft which she did not like.  She had been started on Celexa which have been working fairly well.  Wellbutrin was added last year however she did not like how this made her feel so she stopped taking it.  She is also been on trazodone in the past but she is not taking that anymore either.  She feels as though her depression is fairly well controlled.  She feels as though her mood is generally fairly good.  No suicidal or homicidal ideations.    12 point review of systems completed and negative except for what has been  "described above    History   Smoking Status     Never Smoker   Smokeless Tobacco     Not on file       Current Outpatient Prescriptions   Medication Sig Note     clindamycin (CLEOCIN T) 1 % lotion  5/8/2017: Received from: External Pharmacy     MICROGESTIN FE 1.5/30, 28, 1.5 mg-30 mcg (21)/75 mg (7) per tablet TK 1 T PO QD 5/8/2017: Received from: External Pharmacy     citalopram (CELEXA) 10 MG tablet Take 1 tablet (10 mg total) by mouth daily.          Objective:  Vitals:    05/08/17 1107   BP: (!) 90/58   Pulse: 68   Resp: 12   Temp: 98.4  F (36.9  C)   TempSrc: Oral   Weight: 118 lb 3 oz (53.6 kg)   Height: 5' 2.25\" (1.581 m)       Vital signs reviewed and stable  General: No acute distress  Psych: Appropriate affect  HEENT: moist mucous membranes, pupils equal, round, reactive to light and accomodation, posterior oropharynx clear of erythema or exudate, tympanic membranes are pearly grey bilaterally  Lymph: no cervical or supraclavicular lymphadenopathy  Cardiovascular: regular rate and rhythm with no murmur  Pulmonary: clear to auscultation bilaterally with no wheeze  Abdomen: soft, non tender, non distended with normo-active bowel sounds  Extremities: warm and well perfused with no edema  Skin: warm and dry with no rash         This note has been dictated and transcribed using voice recognition software.   Any errors in transcription are unintentional and inherent to the software.  "

## 2021-06-13 NOTE — PROGRESS NOTES
Assessment/Plan:    Maye Fernandez is a 17 y.o. female presenting for:    1. Cough  Chest x-ray I personally reviewed and finds to be unremarkable.  I will have the radiologist review this for a final read.  Otherwise discussed that this is most likely viral in etiology.  Recommended Tylenol and ibuprofen on a scheduled basis if needed for fevers or body aches.  Also recommended Mucinex to help break the mucus up.  I did send an albuterol inhaler to the pharmacy which she can take as needed.  We discussed how to take the inhaler and the risks or common side effects of the medication.  - XR Chest PA and Lateral        There are no discontinued medications.        Chief Complaint:  Chief Complaint   Patient presents with     Fever     Cough     Cold sxs x 1wk     Shortness of Breath     Chills     Generalized Body Aches     Nasal Congestion       Subjective:   Maye Fernandez is a pleasant 17-year-old female presenting to the clinic today with her mother for concerns over a cold and cough.  Patient states that about 1 week ago the patient had a mildly sore throat.  That overall got better but then she had some sinus congestion.  Then that improved and in the last few days she has had chest congestion and a cough.  She states last night she unfortunately woke up and was having a hard time breathing.  She had one episode of vomiting and then had a hard time catching her breath.  She states she feels as though is difficult to get the air all the way into her lungs.  She also had a fever of 101 F at that time.  She was able to proper pillows up and fall back asleep.  This morning her breathing feels slightly better but it she states that if she lays on her left side her breathing will be difficult again.  She does not have a history of asthma and has never used an inhaler in the past.  She did take some Tylenol about 3 or 4 hours prior to coming to her appointment today.    She has been eating and drinking  "well.  No further vomiting.    12 point review of systems completed and negative except for what has been described above    History   Smoking Status     Never Smoker   Smokeless Tobacco     Not on file       Current Outpatient Prescriptions   Medication Sig Note     citalopram (CELEXA) 10 MG tablet Take 1 tablet (10 mg total) by mouth daily.      clindamycin (CLEOCIN T) 1 % lotion  5/8/2017: Received from: External Pharmacy     fluticasone (FLONASE) 50 mcg/actuation nasal spray USE 2 SPRAYS IN EACH NOSTRIL ONCE DAILY      MICROGESTIN FE 1.5/30, 28, 1.5 mg-30 mcg (21)/75 mg (7) per tablet TK 1 T PO QD 5/8/2017: Received from: External Pharmacy     albuterol (PROAIR HFA;PROVENTIL HFA;VENTOLIN HFA) 90 mcg/actuation inhaler Inhale 2 puffs every 6 (six) hours as needed for wheezing.          Objective:  Vitals:    09/19/17 1147   BP: 98/60   Pulse: 108   Resp: 20   Temp: 98.7  F (37.1  C)   TempSrc: Oral   SpO2: 96%   Weight: 120 lb 6 oz (54.6 kg)   Height: 5' 2.25\" (1.581 m)       Vital signs reviewed and stable  General: No acute distress  Psych: Appropriate affect  HEENT: moist mucous membranes, pupils equal, round, reactive to light and accomodation, posterior oropharynx clear of erythema or exudate, tympanic membranes are pearly grey bilaterally  Lymph: no cervical or supraclavicular lymphadenopathy  Cardiovascular: regular rate and rhythm with no murmur  Pulmonary: clear to auscultation bilaterally with no wheeze  Abdomen: soft, non tender, non distended with normo-active bowel sounds  Extremities: warm and well perfused with no edema  Skin: warm and dry with no rash         This note has been dictated and transcribed using voice recognition software.   Any errors in transcription are unintentional and inherent to the software.  "

## 2021-06-14 NOTE — TELEPHONE ENCOUNTER
Refill Approved    Rx renewed per Medication Renewal Policy. Medication was last renewed on 04/02/2020.  Last office visit was 01/22/2021 with PCP.    Annette Mcdaniel, [facility] Triage/Med Refill 2/1/2021     Requested Prescriptions   Pending Prescriptions Disp Refills     etonogestreL-ethinyl estradioL (NUVARING) 0.12-0.015 mg/24 hr vaginal ring 3 each 3     Sig: Insert 1 each into the vagina every 21 days. Insert 1 ring vaginally and leave in place for 3 weeks, then remove for one 1 week.       Oral Contraceptives Protocol Passed - 2/1/2021  1:00 PM        Passed - Visit with PCP or prescribing provider visit in last 12 months      Last office visit with prescriber/PCP: 2/11/2019 Lashonda Amezquita MD OR same dept: Visit date not found OR same specialty: 1/20/2020 MoriLibrado simon MD  Last physical: Visit date not found Last MTM visit: Visit date not found   Next visit within 3 mo: Visit date not found  Next physical within 3 mo: Visit date not found  Prescriber OR PCP: Lashonda Amezquita MD  Last diagnosis associated with med order: 1. Encounter for initial prescription of vaginal ring hormonal contraceptive  - etonogestreL-ethinyl estradioL (NUVARING) 0.12-0.015 mg/24 hr vaginal ring; Insert 1 each into the vagina every 21 days. Insert 1 ring vaginally and leave in place for 3 weeks, then remove for one 1 week.  Dispense: 3 each; Refill: 3    If protocol passes may refill for 12 months if within 3 months of last provider visit (or a total of 15 months).

## 2021-06-14 NOTE — TELEPHONE ENCOUNTER
Dr. Amezquita has transitioned to the [facility].  [place] is in the same health system as [facility] ([facility]) but the electronic medical records are not immediately connected.  Dr. Amezquita is still [facility] messages once daily.  If you need more immediate assistance/ advice there are several options:    1. Sign up for [place] [employer] at: [email or web address]  2. Call the clinic at [phone] to either talk with nurse triage or schedule an appointment  Additionally, she will not be able to prescribe ANY controlled substances through [facility].  If you need a controlled substance refill, please contact your pharmacy and have them send the request to the [facility] - the address is [address] [place] [address].    Sorry for any inconvenience.  I will forward this message to her to review in the next few business days!

## 2021-06-15 NOTE — PROGRESS NOTES
Assessment/Plan:    Maye Fernandez is a 17 y.o. female presenting for:    1. Anxiety and depression  It is a bit difficult to tell if the patient simply does not like the Celexa because she does not feel as though it is working or if she is not actually tolerating it.  I have encouraged her to increase her Celexa to 10 mg daily and I sent a refill to the pharmacy.  She has already been taken off of Prozac due to suicidal ideations, Zoloft because she did not feel as though it was working well, and Wellbutrin because she did not like how it made her feel.  I did discuss that it would be beneficial to maximize the dosage of the medication prior to stopping and switching to different medication if she is tolerating it well.  Again, she will try increasing to 10 mg daily.  A referral to psychiatry was placed as well.  - Ambulatory referral to Psychiatry  - citalopram (CELEXA) 10 MG tablet; TAKE 1 TABLET(10 MG) BY MOUTH DAILY  Dispense: 90 tablet; Refill: 2    2. Acne  Refill of clindamycin and sulfacetamide solution.  - clindamycin (CLEOCIN T) 1 % lotion; Use once daily to face  Dispense: 60 mL; Refill: 3  - sulfacetamide sodium-sulfur 10-5 % (w/w) Clsr; One application daily  Dispense: 340 g; Refill: 1        Medications Discontinued During This Encounter   Medication Reason     citalopram (CELEXA) 10 MG tablet      clindamycin (CLEOCIN T) 1 % lotion Reorder           Chief Complaint:  Chief Complaint   Patient presents with     Medication Education Visit     Med check- discuss another med       Subjective:   Maye Fernandez is a 17-year-old female presenting to the clinic today with her mother for concerns over depression.  Patient has a several year history of depression and anxiety.  She has been on several medications in the past.  She was on Prozac for a while and did well with it but then was having issues and actually ended up in the hospital after having some suicidal ideations.  In the hospital  "she was switched from Prozac to Zoloft.  Wellbutrin was then added.  She did well with these medications for a while but then noted that they were not working well and she was discontinued on the medications and started on Celexa.  She was on Celexa 5 mg daily for a while now and had been doing well.  She states that she feels as though the medication makes her nauseated.  She is unsure if she takes it with food.  She is unsure if this is always been a problem or if this is more recent.  She does not think that it is her anxiety causing the nausea but is unsure.  She is also having difficult time sleeping.  She does state the Celexa seems to work well and she has noted that the past.    She denies any suicidal or homicidal ideations.  She states \"the side effects are killing me!!\" but is unable to specify further    12 point review of systems completed and negative except for what has been described above    History   Smoking Status     Never Smoker   Smokeless Tobacco     Never Used       Current Outpatient Prescriptions   Medication Sig Note     albuterol (PROAIR HFA;PROVENTIL HFA;VENTOLIN HFA) 90 mcg/actuation inhaler Inhale 2 puffs every 6 (six) hours as needed for wheezing.      citalopram (CELEXA) 10 MG tablet TAKE 1 TABLET(10 MG) BY MOUTH DAILY      clindamycin (CLEOCIN T) 1 % lotion Use once daily to face      fluticasone (FLONASE) 50 mcg/actuation nasal spray USE 2 SPRAYS IN EACH NOSTRIL ONCE DAILY      MICROGESTIN FE 1.5/30, 28, 1.5 mg-30 mcg (21)/75 mg (7) per tablet TK 1 T PO QD 5/8/2017: Received from: External Pharmacy     sulfacetamide sodium-sulfur 10-5 % (w/w) Clsr One application daily          Objective:  Vitals:    01/23/18 1304   BP: 96/50   Pulse: 68   Resp: 12   Temp: 98.5  F (36.9  C)   TempSrc: Oral   Weight: 113 lb 6 oz (51.4 kg)   Height: 5' 2.5\" (1.588 m)       Vital signs reviewed and stable  General: No acute distress  Psych: Appropriate affect  HEENT: moist mucous membranes  Lymph: no " cervical or supraclavicular lymphadenopathy  Cardiovascular: regular rate and rhythm with no murmur  Pulmonary: clear to auscultation bilaterally with no wheeze  Abdomen: soft, non tender, non distended with normo-active bowel sounds  Extremities: warm and well perfused with no edema  Skin: warm and dry with no rash         This note has been dictated and transcribed using voice recognition software.   Any errors in transcription are unintentional and inherent to the software.

## 2021-06-23 NOTE — PATIENT INSTRUCTIONS - HE
"1. Nothing - you could choose to not take anything and use condoms as birth control but the need to be used EVERY TIME to be effective.    2. Oral Contraceptive Pills - these are generally a combination of estrogen and progesterone and would require you to take a daily pill.  In a pack there is most commonly 3 weeks of pills with hormones (active pills) and a week of pills that have no hormone - you would have your menstrual cycle on the week with no hormone.  The pills can often control your periods and make them regular.  Most people tolerate them very well but the estrogen can very slightly increase your risk for blood clots - again minimal risk in otherwise healthy non-smokers.    3. Nuva Ring - this is a flexible vaginal ring that you insert into your vaginal canal once monthly.  It stays inserted for three weeks and then you remove it for a week to have your period.  It is also a combination of estrogen and progesterone.  The benefit is that you only need to remember something twice monthly (put it in and take it out).      4. Depo-provera Shot - this is a shot your receive in your arm here in clinic once every three months (at a \"nurse only\" appointment).  It is progeserone only so there is not really the increased risk for blood clots as there are in the combination (estrogen/progesterone) medications.  It is nice as you only have to remember to do something 4 times a year.  Some people have noted some weight gain with this form of contraception but most women do not have issues.  Many times your period will stop entirely but sometimes women will have spotting which can be annoying (particularily for the first three months).      5. Nexplanon arm implant - a progesterone only arm implant (small cecile) that is an effective form of birth control for three years.  It is inserted via a needle here in clinic.  Most people tolerate the procedure very well.  Menstrual spotting is very common for the first few months " after insertion but often your period stops which is a nice benefit.  It can be removed at any time if you decide you would like to become pregnant.     6. Intrauterine Device (IUD) - and IUD is a device that is inserted into your uterus here in clinic.  The procedure can be a bit crampy (generally pretty well tolerated if you have had a baby vaginally)  There are two types available      - Mirena IUD - progesterone only birth control that lasts for 5 years.  Like the Nexplanon  menstrual spotting can occur for the first few months after insertion but often your period stops which is a nice benefit.  It can be removed at any time if you decide you would like to become pregnant.     - Paraguard IUD (copper IUD) - non hormonal IUD that lasts 10 years.  This is a great option for those who do not tolerate hormonal contraceptives.  It can make your periods a little heavier and is probably not a great choice for women who already have heavy cycles.

## 2021-06-23 NOTE — PROGRESS NOTES
Assessment/Plan:    Maye Fernandez is a 19 y.o. female presenting for:    1. Encounter for initial prescription of vaginal ring hormonal contraceptive  We spent considerable time discussing multiple forms of contraception.  She would like to try the NuvaRing to see if she likes it.  If she does not like this she would consider doing the Depo-Provera shot or the Nexplanon arm implant.  Brochures were given as well as explanations of all of these.  - etonogestrel-ethinyl estradiol (NUVARING) 0.12-0.015 mg/24 hr vaginal ring; Insert 1 each into the vagina every 21 days. Insert 1 ring vaginally and leave in place for 3 weeks, then remove for one 1 week.  Dispense: 3 each; Refill: 3    2. Healthcare maintenance  - HPV vaccine 9 valent 3 dose IM    #3.  Depression  In complete remission.  She is currently off of her Celexa.      She will follow-up for nurse only visits for her HPV vaccinations and in 1 year for a physical.    Medications Discontinued During This Encounter   Medication Reason     citalopram (CELEXA) 10 MG tablet Therapy completed     citalopram (CELEXA) 20 MG tablet      albuterol (PROAIR HFA;PROVENTIL HFA;VENTOLIN HFA) 90 mcg/actuation inhaler Therapy completed     MICROGESTIN FE 1.5/30, 28, 1.5 mg-30 mcg (21)/75 mg (7) per tablet            Chief Complaint:  Chief Complaint   Patient presents with     Contraception     Discuss birth control       Subjective:   Maye Fernandez is a very pleasant 19-year-old female presenting to the clinic today for a med check and birth control discussion.    The patient has been on oral contraceptive pills for several years to help with her heavy menstrual cycles as well as cramping.  She is sexually active as well.  She finds that she has a difficult time remembering to take her birth control pill and is wondering what other options she has.  She has no concerns for sexually transmitted diseases.    Depression: Patient is a history of anxiety and  "depression.  She has been on several medications.  Most recently she was on Celexa but weaned herself off of this several months ago and is doing very well.  She is a  at [employer] in [place] and enjoys her job.  She is hopeful to go to [employer] school in [place] sometime in the next few years.    12 point review of systems completed and negative except for what has been described above    Social History     Tobacco Use   Smoking Status Never Smoker   Smokeless Tobacco Never Used       Current Outpatient Medications   Medication Sig     clindamycin (CLEOCIN T) 1 % lotion APPLY EXTERNALLY TO FACE EVERY DAY     fluticasone (FLONASE) 50 mcg/actuation nasal spray USE 2 SPRAYS IN EACH NOSTRIL ONCE DAILY     sulfacetamide sodium-sulfur 10-5 % (w/w) Clsr One application daily     etonogestrel-ethinyl estradiol (NUVARING) 0.12-0.015 mg/24 hr vaginal ring Insert 1 each into the vagina every 21 days. Insert 1 ring vaginally and leave in place for 3 weeks, then remove for one 1 week.         Objective:  Vitals:    02/11/19 0858   BP: 90/58   Pulse: 60   Resp: 12   Temp: 98.3  F (36.8  C)   TempSrc: Oral   Weight: 124 lb 7 oz (56.4 kg)   Height: 5' 2.5\" (1.588 m)       Body mass index is 22.4 kg/m .    Vital signs reviewed and stable  General: No acute distress  Psych: Appropriate affect  HEENT: moist mucous membrane  Lymph: no cervical or supraclavicular lymphadenopathy  Cardiovascular: regular rate and rhythm with no murmur  Pulmonary: clear to auscultation bilaterally with no wheeze  Abdomen: soft, non tender, non distended with normo-active bowel sounds  Extremities: warm and well perfused with no edema  Skin: warm and dry with no rash         This note has been dictated and transcribed using voice recognition software.   Any errors in transcription are unintentional and inherent to the software.  "

## 2021-06-23 NOTE — TELEPHONE ENCOUNTER
RN cannot approve Refill Request    RN can NOT refill this medication med is not covered by policy/route to provider     . Last office visit: 1/23/2018 Lashonda Amezquita MD Last Physical: Visit date not found Last MTM visit: Visit date not found Last visit same specialty: 1/23/2018 Lashonda Amezquita MD.  Next visit within 3 mo: Visit date not found  Next physical within 3 mo: Visit date not found      Ashley García, [facility] Triage/Med Refill 1/16/2019    Requested Prescriptions   Pending Prescriptions Disp Refills     clindamycin (CLEOCIN T) 1 % lotion [Pharmacy Med Name: CLINDAMYCIN 1% LOTION 60ML]  0     Sig: APPLY EXTERNALLY TO FACE EVERY DAY    There is no refill protocol information for this order

## 2021-06-30 ENCOUNTER — MYC MEDICAL ADVICE (OUTPATIENT)
Dept: FAMILY MEDICINE | Facility: CLINIC | Age: 21
End: 2021-06-30

## 2021-07-03 NOTE — ADDENDUM NOTE
Addendum Note by Lashonda Amezquita MD at 12/3/2020  6:42 PM     Author: Lashonda Amezquita MD Service: -- Author Type: Physician    Filed: 12/3/2020  6:42 PM Encounter Date: 11/27/2020 Status: Signed    : Lashonda Amezquita MD (Physician)    Addended by: LASHONDA AMEZQUITA on: 12/3/2020 06:42 PM        Modules accepted: Orders

## 2021-10-03 ENCOUNTER — HEALTH MAINTENANCE LETTER (OUTPATIENT)
Age: 21
End: 2021-10-03

## 2021-11-22 DIAGNOSIS — J30.81 ALLERGIC RHINITIS DUE TO ANIMALS: Primary | ICD-10-CM

## 2021-11-22 DIAGNOSIS — L70.9 ACNE, UNSPECIFIED ACNE TYPE: ICD-10-CM

## 2021-11-22 RX ORDER — CLINDAMYCIN PHOSPHATE 10 UG/ML
LOTION TOPICAL
Qty: 60 ML | Refills: 1 | Status: SHIPPED | OUTPATIENT
Start: 2021-11-22 | End: 2022-06-23

## 2021-11-22 RX ORDER — SODIUM SULFACETAMIDE AND SULFUR 100; 50 MG/G; MG/G
CREAM TOPICAL
Qty: 28 G | Refills: 1 | Status: SHIPPED | OUTPATIENT
Start: 2021-11-22 | End: 2021-12-27

## 2021-11-22 RX ORDER — FLUTICASONE PROPIONATE 50 MCG
2 SPRAY, SUSPENSION (ML) NASAL DAILY
Qty: 16 G | Refills: 1 | Status: SHIPPED | OUTPATIENT
Start: 2021-11-22

## 2021-11-23 ENCOUNTER — TELEPHONE (OUTPATIENT)
Dept: FAMILY MEDICINE | Facility: CLINIC | Age: 21
End: 2021-11-23
Payer: COMMERCIAL

## 2021-11-23 DIAGNOSIS — L70.9 ACNE, UNSPECIFIED ACNE TYPE: Primary | ICD-10-CM

## 2021-11-23 RX ORDER — SULFACETAMIDE SODIUM, SULFUR 100; 50 MG/G; MG/G
1 EMULSION TOPICAL DAILY
Qty: 227 G | Refills: 0 | Status: SHIPPED | OUTPATIENT
Start: 2021-11-23 | End: 2022-01-02

## 2021-11-23 NOTE — TELEPHONE ENCOUNTER
Sulfacetamide Sodium-Sulfur 10-5 % CREA 28 g 1 11/22/2021     Script Clarification- patient wanted facial wash we believe (usually comes as a 227 GM Bottle)...Is the topical cream correct?

## 2021-11-30 ENCOUNTER — OFFICE VISIT (OUTPATIENT)
Dept: URGENT CARE | Facility: URGENT CARE | Age: 21
End: 2021-11-30
Payer: COMMERCIAL

## 2021-11-30 VITALS
RESPIRATION RATE: 18 BRPM | WEIGHT: 134.4 LBS | TEMPERATURE: 99.3 F | DIASTOLIC BLOOD PRESSURE: 78 MMHG | HEART RATE: 102 BPM | OXYGEN SATURATION: 98 % | BODY MASS INDEX: 24.19 KG/M2 | SYSTOLIC BLOOD PRESSURE: 114 MMHG

## 2021-11-30 DIAGNOSIS — J02.9 SORE THROAT: ICD-10-CM

## 2021-11-30 DIAGNOSIS — J03.90 TONSILLITIS: Primary | ICD-10-CM

## 2021-11-30 LAB
DEPRECATED S PYO AG THROAT QL EIA: NEGATIVE
HOLD SPECIMEN: NORMAL
MONOCYTES NFR BLD AUTO: NEGATIVE %

## 2021-11-30 PROCEDURE — 36415 COLL VENOUS BLD VENIPUNCTURE: CPT | Performed by: PHYSICIAN ASSISTANT

## 2021-11-30 PROCEDURE — 86308 HETEROPHILE ANTIBODY SCREEN: CPT | Performed by: PHYSICIAN ASSISTANT

## 2021-11-30 PROCEDURE — 87651 STREP A DNA AMP PROBE: CPT | Performed by: PHYSICIAN ASSISTANT

## 2021-11-30 PROCEDURE — 99213 OFFICE O/P EST LOW 20 MIN: CPT | Performed by: PHYSICIAN ASSISTANT

## 2021-11-30 RX ORDER — AMOXICILLIN 875 MG
875 TABLET ORAL 2 TIMES DAILY
Qty: 20 TABLET | Refills: 0 | Status: SHIPPED | OUTPATIENT
Start: 2021-11-30 | End: 2021-12-10

## 2021-11-30 NOTE — LETTER
[facility] [place]  [address] [address]  Phone: [phone]  Fax: [phone]    November 30, 2021        Maye Fernandez  [address] [address]          To whom it may concern:    RE: Maye Fernandez    Patient was seen and treated today at our clinic and missed school 11/29/21 through 12/03/21.    Please contact me for questions or concerns.      Sincerely,        Maryjane Beltran PA-C

## 2021-11-30 NOTE — PROGRESS NOTES
Assessment & Plan     Tonsillitis  Strep and mono negative. Patient was squeezing at tonsil stone on the right. Will treat with amoxicillin (AMOXIL) 875 MG tablet; Take 1 tablet (875 mg) by mouth 2 times daily for 10 days. Return to clinic if symptoms worsen or do not improve; otherwise follow up as needed      Sore throat    - Streptococcus A Rapid Screen w/Reflex to PCR - Clinic Collect  - Group A Streptococcus PCR Throat Swab  - Mononucleosis screen; Future  - Mononucleosis screen                 Return in about 1 week (around 12/7/2021), or if symptoms worsen or fail to improve.    KEILA Justin [facility] [place]              Subjective    Chief Complaint   Patient presents with     Pharyngitis     2.5 weeks sore throat then went away, 7 days now it's back, thought it was a tonsil stone, taking 400mg ibuprofen. 11:25am can swab for strep drank water.       HPI     URI Adult    Onset of symptoms was 1 week(s) ago.  Course of illness is same.    Severity moderate  Current and Associated symptoms: sore throat, congestion, headache  Treatment measures tried include Tylenol/Ibuprofen.  Predisposing factors include COVID last year and fully vaccinated                 Review of Systems   Constitutional, HEENT, cardiovascular, pulmonary, gi and gu systems are negative, except as otherwise noted.      Objective    /78 (BP Location: Right arm, Patient Position: Sitting, Cuff Size: Adult Regular)   Pulse 102   Temp 99.3  F (37.4  C) (Tympanic)   Resp 18   Wt 61 kg (134 lb 6.4 oz)   SpO2 98%   BMI 24.19 kg/m    Body mass index is 24.19 kg/m .  Physical Exam  Constitutional:       Appearance: She is well-developed.   HENT:      Head: Normocephalic.      Right Ear: Tympanic membrane and ear canal normal.      Left Ear: Tympanic membrane and ear canal normal.      Mouth/Throat:      Tonsils: Tonsillar exudate present. 2+ on the right.   Eyes:      Conjunctiva/sclera: Conjunctivae  normal.   Cardiovascular:      Rate and Rhythm: Normal rate.      Heart sounds: Normal heart sounds.   Pulmonary:      Effort: Pulmonary effort is normal.      Breath sounds: Normal breath sounds.   Skin:     General: Skin is warm and dry.      Findings: No rash.   Psychiatric:         Behavior: Behavior normal.

## 2021-12-01 LAB — GROUP A STREP BY PCR: NOT DETECTED

## 2021-12-21 PROBLEM — J30.9 ALLERGIC RHINITIS: Status: ACTIVE | Noted: 2021-12-21

## 2021-12-27 ENCOUNTER — OFFICE VISIT (OUTPATIENT)
Dept: FAMILY MEDICINE | Facility: CLINIC | Age: 21
End: 2021-12-27
Payer: COMMERCIAL

## 2021-12-27 VITALS
BODY MASS INDEX: 23.94 KG/M2 | HEART RATE: 64 BPM | TEMPERATURE: 98.8 F | HEIGHT: 63 IN | SYSTOLIC BLOOD PRESSURE: 118 MMHG | WEIGHT: 135.13 LBS | RESPIRATION RATE: 12 BRPM | DIASTOLIC BLOOD PRESSURE: 62 MMHG

## 2021-12-27 DIAGNOSIS — Z30.015 ENCOUNTER FOR INITIAL PRESCRIPTION OF VAGINAL RING HORMONAL CONTRACEPTIVE: ICD-10-CM

## 2021-12-27 DIAGNOSIS — M25.561 ACUTE PAIN OF RIGHT KNEE: Primary | ICD-10-CM

## 2021-12-27 PROCEDURE — 99213 OFFICE O/P EST LOW 20 MIN: CPT | Performed by: FAMILY MEDICINE

## 2021-12-27 RX ORDER — ETONOGESTREL AND ETHINYL ESTRADIOL VAGINAL RING .015; .12 MG/D; MG/D
1 RING VAGINAL
Qty: 3 EACH | Refills: 4 | Status: SHIPPED | OUTPATIENT
Start: 2021-12-27 | End: 2022-01-10

## 2021-12-27 ASSESSMENT — MIFFLIN-ST. JEOR: SCORE: 1339.11

## 2021-12-30 NOTE — PROGRESS NOTES
Assessment/Plan:    Maye Fernandez is a 21 year old female presenting for:    Acute pain of right knee  Encouraged her to get a knee brace to help support her knee for the next week or 2.  Discussed range of motion exercises.  We did discuss x-ray but she declines today.  She will let me know if things are not improving.    Encounter for initial prescription of vaginal ring hormonal contraceptive  Refill  - etonogestrel-ethinyl estradiol (NUVARING) 0.12-0.015 MG/24HR vaginal ring  Dispense: 3 each; Refill: 4      Medications Discontinued During This Encounter   Medication Reason     Sulfacetamide Sodium-Sulfur 10-5 % CREA      ADVAIR DISKUS 250-50 MCG/DOSE inhaler      etonogestrel-ethinyl estradiol (NUVARING) 0.12-0.015 MG/24HR vaginal ring Reorder           Chief Complaint:  Knee Pain        Subjective:   Maye Fernandez is a very pleasant 21-year-old female presenting to the clinic today for 2 concerns:    1.  Right knee pain: Patient states that she was skiing about 2 weeks ago.  She fell onto her right knee.  She states that she was unable to stand up and had to have some friends help her off of the ice.  She states for a few days she was unable to put much pressure on that knee however she is able to walk now.  She states that she is still limping a little.  She has not really noticed much swelling.  There is not been any bruising.  She does think that it is slowly improving.  It does catch occasionally but has not given out on her.    2.  Birth control: Patient like a refill of her NuvaRing.  This is working well for her.    12 point review of systems completed and negative except for what has been described above    History   Smoking Status     Never Smoker   Smokeless Tobacco     Never Used         Current Outpatient Medications:      clindamycin (CLEOCIN T) 1 % external lotion, ADILENE EXT TO FACE BID, Disp: 60 mL, Rfl: 1     etonogestrel-ethinyl estradiol (NUVARING) 0.12-0.015 MG/24HR vaginal  "ring, Place 1 each vaginally every 30 days, Disp: 3 each, Rfl: 4     fluticasone (FLONASE) 50 MCG/ACT nasal spray, Spray 2 sprays into both nostrils daily, Disp: 16 g, Rfl: 1     Sulfacetamide Sodium-Sulfur 10-5 % LIQD, Externally apply 1 Application topically daily To face, Disp: 227 g, Rfl: 0     tretinoin (RETIN-A) 0.05 % external cream, , Disp: , Rfl:      VENTOLIN  (90 Base) MCG/ACT inhaler, Inhale 2 puffs into the lungs every 6 hours as needed for shortness of breath / dyspnea or wheezing, Disp: 18 g, Rfl: 0      Objective:  Vitals:    12/27/21 1455   BP: 118/62   Pulse: 64   Resp: 12   Temp: 98.8  F (37.1  C)   TempSrc: Tympanic   Weight: 61.3 kg (135 lb 2 oz)   Height: 1.588 m (5' 2.5\")       Body mass index is 24.32 kg/m .    Vital signs reviewed and stable  General: No acute distress  Psych: Appropriate affect  HEENT: moist mucous membranes  Extremities: warm and well perfused with no edema  Musculoskeletal: Slight swelling with no tenderness of palpation of the right knee.  Normal provocative maneuvers.  Gait is limping slightly favoring the right.  Skin: warm and dry with no rash         This note has been dictated and transcribed using voice recognition software.   Any errors in transcription are unintentional and inherent to the software.  "

## 2022-01-02 DIAGNOSIS — L70.9 ACNE, UNSPECIFIED ACNE TYPE: ICD-10-CM

## 2022-01-02 RX ORDER — SULFACETAMIDE SODIUM, SULFUR 100; 50 MG/G; MG/G
EMULSION TOPICAL
Qty: 227 G | Refills: 1 | Status: SHIPPED | OUTPATIENT
Start: 2022-01-02 | End: 2022-11-14

## 2022-01-02 NOTE — TELEPHONE ENCOUNTER
Routing rx to Provider because it is not on the RN refill protocol.  Thank you.  Osman Horowitz, RN

## 2022-01-07 DIAGNOSIS — Z30.015 ENCOUNTER FOR INITIAL PRESCRIPTION OF VAGINAL RING HORMONAL CONTRACEPTIVE: ICD-10-CM

## 2022-01-07 RX ORDER — ETONOGESTREL/ETHINYL ESTRADIOL .12-.015MG
RING, VAGINAL VAGINAL
OUTPATIENT
Start: 2022-01-07

## 2022-01-07 NOTE — TELEPHONE ENCOUNTER
Refused refill,duplicate.  Sent to [facility] 12/27/21 (3) with 4 refills.  Leslee Goel RN

## 2022-01-10 ENCOUNTER — MYC MEDICAL ADVICE (OUTPATIENT)
Dept: FAMILY MEDICINE | Facility: CLINIC | Age: 22
End: 2022-01-10
Payer: COMMERCIAL

## 2022-01-10 DIAGNOSIS — Z30.015 ENCOUNTER FOR INITIAL PRESCRIPTION OF VAGINAL RING HORMONAL CONTRACEPTIVE: ICD-10-CM

## 2022-01-10 RX ORDER — ETONOGESTREL AND ETHINYL ESTRADIOL VAGINAL RING .015; .12 MG/D; MG/D
1 RING VAGINAL
Qty: 3 EACH | Refills: 4 | Status: SHIPPED | OUTPATIENT
Start: 2022-01-10 | End: 2022-01-11

## 2022-01-10 NOTE — TELEPHONE ENCOUNTER
Re-sent nuvaring order to pharmacy sent initially 12/27/21.  Cancelled pharmacy request to refill this medication 1/7/22 as it was duplicate from 12/27/21-pharmacy states they did not receive the order.  Leslee Goel RN

## 2022-01-11 ENCOUNTER — TELEPHONE (OUTPATIENT)
Dept: FAMILY MEDICINE | Facility: CLINIC | Age: 22
End: 2022-01-11
Payer: COMMERCIAL

## 2022-01-11 DIAGNOSIS — Z30.015 ENCOUNTER FOR INITIAL PRESCRIPTION OF VAGINAL RING HORMONAL CONTRACEPTIVE: ICD-10-CM

## 2022-01-11 RX ORDER — ETONOGESTREL AND ETHINYL ESTRADIOL VAGINAL RING .015; .12 MG/D; MG/D
RING VAGINAL
Qty: 3 EACH | Refills: 4 | Status: SHIPPED | OUTPATIENT
Start: 2022-01-11 | End: 2023-01-19

## 2022-01-11 NOTE — TELEPHONE ENCOUNTER
Pharmacy requesting clarification of directions for Nuvaring.  State typically placed for three weeks, then 1 week free.    YARY LAMB

## 2022-01-26 ASSESSMENT — PATIENT HEALTH QUESTIONNAIRE - PHQ9
5. POOR APPETITE OR OVEREATING: MORE THAN HALF THE DAYS
SUM OF ALL RESPONSES TO PHQ QUESTIONS 1-9: 6

## 2022-01-26 ASSESSMENT — ANXIETY QUESTIONNAIRES
IF YOU CHECKED OFF ANY PROBLEMS ON THIS QUESTIONNAIRE, HOW DIFFICULT HAVE THESE PROBLEMS MADE IT FOR YOU TO DO YOUR WORK, TAKE CARE OF THINGS AT HOME, OR GET ALONG WITH OTHER PEOPLE: SOMEWHAT DIFFICULT
GAD7 TOTAL SCORE: 10
3. WORRYING TOO MUCH ABOUT DIFFERENT THINGS: MORE THAN HALF THE DAYS
1. FEELING NERVOUS, ANXIOUS, OR ON EDGE: MORE THAN HALF THE DAYS
2. NOT BEING ABLE TO STOP OR CONTROL WORRYING: MORE THAN HALF THE DAYS
7. FEELING AFRAID AS IF SOMETHING AWFUL MIGHT HAPPEN: NOT AT ALL
5. BEING SO RESTLESS THAT IT IS HARD TO SIT STILL: SEVERAL DAYS
6. BECOMING EASILY ANNOYED OR IRRITABLE: SEVERAL DAYS

## 2022-01-27 ASSESSMENT — ANXIETY QUESTIONNAIRES: GAD7 TOTAL SCORE: 10

## 2022-04-01 ENCOUNTER — TELEPHONE (OUTPATIENT)
Dept: FAMILY MEDICINE | Facility: CLINIC | Age: 22
End: 2022-04-01
Payer: COMMERCIAL

## 2022-04-01 DIAGNOSIS — L70.9 ACNE, UNSPECIFIED ACNE TYPE: Primary | ICD-10-CM

## 2022-04-01 RX ORDER — TRETINOIN 0.5 MG/G
CREAM TOPICAL AT BEDTIME
Qty: 45 G | Refills: 11 | Status: SHIPPED | OUTPATIENT
Start: 2022-04-01 | End: 2023-02-14

## 2022-04-01 NOTE — TELEPHONE ENCOUNTER
Reason for Call:  Medication or medication refill:    Do you use a [facility]?  Name of the pharmacy and phone number for the current request:  [facility] - [phone]    Name of the medication requested: Tretinoin Creme 0.05%    Other request:     Can we leave a detailed message on this number? YES    Phone number patient can be reached at: Cell number on file:    Telephone Information:   Mobile [phone]       Best Time: Anytime after 10 AM    Call taken on 4/1/2022 at 4:08 PM by Kari Silva

## 2022-05-02 ENCOUNTER — MYC MEDICAL ADVICE (OUTPATIENT)
Dept: PODIATRY | Facility: CLINIC | Age: 22
End: 2022-05-02
Payer: COMMERCIAL

## 2022-05-02 DIAGNOSIS — M77.52 CAPSULITIS OF METATARSOPHALANGEAL (MTP) JOINT OF LEFT FOOT: Primary | ICD-10-CM

## 2022-05-15 ENCOUNTER — HEALTH MAINTENANCE LETTER (OUTPATIENT)
Age: 22
End: 2022-05-15

## 2022-06-22 DIAGNOSIS — L70.9 ACNE, UNSPECIFIED ACNE TYPE: ICD-10-CM

## 2022-06-23 RX ORDER — CLINDAMYCIN PHOSPHATE 10 UG/ML
LOTION TOPICAL
Qty: 60 ML | Refills: 1 | Status: SHIPPED | OUTPATIENT
Start: 2022-06-23 | End: 2022-08-30

## 2022-06-23 NOTE — TELEPHONE ENCOUNTER
Routing refill request to provider for review/approval because:  Patient needs to be seen because:  Overdue annual 3/1/22  Leslee Goel RN

## 2022-08-30 DIAGNOSIS — L70.9 ACNE, UNSPECIFIED ACNE TYPE: ICD-10-CM

## 2022-08-30 RX ORDER — CLINDAMYCIN PHOSPHATE 10 UG/ML
LOTION TOPICAL
Qty: 60 ML | Refills: 1 | Status: SHIPPED | OUTPATIENT
Start: 2022-08-30 | End: 2023-01-19

## 2022-08-30 NOTE — TELEPHONE ENCOUNTER
Routing refill request to provider for review/approval because:  Patient needs to be seen because it has been more than 1 year since last office visit.3/1/21  Leslee Goel RN

## 2022-09-11 ENCOUNTER — HEALTH MAINTENANCE LETTER (OUTPATIENT)
Age: 22
End: 2022-09-11

## 2022-11-14 DIAGNOSIS — L70.9 ACNE, UNSPECIFIED ACNE TYPE: ICD-10-CM

## 2022-11-14 RX ORDER — SULFACETAMIDE SODIUM, SULFUR 100; 50 MG/G; MG/G
EMULSION TOPICAL
Qty: 227 G | Refills: 1 | Status: SHIPPED | OUTPATIENT
Start: 2022-11-14 | End: 2023-03-20

## 2022-11-14 NOTE — TELEPHONE ENCOUNTER
Routing refill request to provider for review/approval because:  Drug not on the [facility] refill protocol   Leslee Goel RN

## 2023-02-14 ENCOUNTER — OFFICE VISIT (OUTPATIENT)
Dept: FAMILY MEDICINE | Facility: CLINIC | Age: 23
End: 2023-02-14
Payer: COMMERCIAL

## 2023-02-14 VITALS
HEIGHT: 63 IN | OXYGEN SATURATION: 98 % | BODY MASS INDEX: 23.77 KG/M2 | RESPIRATION RATE: 20 BRPM | HEART RATE: 116 BPM | SYSTOLIC BLOOD PRESSURE: 122 MMHG | WEIGHT: 134.13 LBS | TEMPERATURE: 98.8 F | DIASTOLIC BLOOD PRESSURE: 82 MMHG

## 2023-02-14 DIAGNOSIS — L70.9 ACNE, UNSPECIFIED ACNE TYPE: ICD-10-CM

## 2023-02-14 DIAGNOSIS — Z30.44 ENCOUNTER FOR SURVEILLANCE OF VAGINAL RING HORMONAL CONTRACEPTIVE DEVICE: ICD-10-CM

## 2023-02-14 DIAGNOSIS — Z11.3 SCREENING FOR STDS (SEXUALLY TRANSMITTED DISEASES): ICD-10-CM

## 2023-02-14 DIAGNOSIS — Z00.00 ENCOUNTER FOR ROUTINE ADULT HEALTH EXAMINATION WITHOUT ABNORMAL FINDINGS: Primary | ICD-10-CM

## 2023-02-14 DIAGNOSIS — F32.5 MAJOR DEPRESSION IN REMISSION (H): ICD-10-CM

## 2023-02-14 DIAGNOSIS — R41.840 CONCENTRATION DEFICIT: ICD-10-CM

## 2023-02-14 DIAGNOSIS — F41.9 ANXIETY: ICD-10-CM

## 2023-02-14 PROCEDURE — 90472 IMMUNIZATION ADMIN EACH ADD: CPT | Performed by: PHYSICIAN ASSISTANT

## 2023-02-14 PROCEDURE — 90686 IIV4 VACC NO PRSV 0.5 ML IM: CPT | Performed by: PHYSICIAN ASSISTANT

## 2023-02-14 PROCEDURE — 87591 N.GONORRHOEAE DNA AMP PROB: CPT | Performed by: PHYSICIAN ASSISTANT

## 2023-02-14 PROCEDURE — 99395 PREV VISIT EST AGE 18-39: CPT | Mod: 25 | Performed by: PHYSICIAN ASSISTANT

## 2023-02-14 PROCEDURE — 90715 TDAP VACCINE 7 YRS/> IM: CPT | Performed by: PHYSICIAN ASSISTANT

## 2023-02-14 PROCEDURE — 90651 9VHPV VACCINE 2/3 DOSE IM: CPT | Performed by: PHYSICIAN ASSISTANT

## 2023-02-14 PROCEDURE — 99213 OFFICE O/P EST LOW 20 MIN: CPT | Mod: 25 | Performed by: PHYSICIAN ASSISTANT

## 2023-02-14 PROCEDURE — 90471 IMMUNIZATION ADMIN: CPT | Performed by: PHYSICIAN ASSISTANT

## 2023-02-14 PROCEDURE — 87491 CHLMYD TRACH DNA AMP PROBE: CPT | Performed by: PHYSICIAN ASSISTANT

## 2023-02-14 RX ORDER — ETONOGESTREL AND ETHINYL ESTRADIOL VAGINAL RING .015; .12 MG/D; MG/D
RING VAGINAL
Qty: 3 EACH | Refills: 3 | Status: SHIPPED | OUTPATIENT
Start: 2023-02-14 | End: 2023-12-21

## 2023-02-14 RX ORDER — TRETINOIN 0.5 MG/G
CREAM TOPICAL AT BEDTIME
Qty: 45 G | Refills: 11 | Status: SHIPPED | OUTPATIENT
Start: 2023-02-14 | End: 2024-07-30

## 2023-02-14 ASSESSMENT — ENCOUNTER SYMPTOMS
NAUSEA: 0
DYSURIA: 0
HEADACHES: 0
FEVER: 0
HEARTBURN: 0
HEMATURIA: 0
BREAST MASS: 0
ARTHRALGIAS: 0
DIZZINESS: 0
NERVOUS/ANXIOUS: 0
SORE THROAT: 0
EYE PAIN: 0
MYALGIAS: 0
PARESTHESIAS: 0
ABDOMINAL PAIN: 0
CHILLS: 0
HEMATOCHEZIA: 0
CONSTIPATION: 0
PALPITATIONS: 0
FREQUENCY: 0
JOINT SWELLING: 0
SHORTNESS OF BREATH: 0
COUGH: 0
DIARRHEA: 0

## 2023-02-14 ASSESSMENT — PATIENT HEALTH QUESTIONNAIRE - PHQ9
SUM OF ALL RESPONSES TO PHQ QUESTIONS 1-9: 7
SUM OF ALL RESPONSES TO PHQ QUESTIONS 1-9: 7
10. IF YOU CHECKED OFF ANY PROBLEMS, HOW DIFFICULT HAVE THESE PROBLEMS MADE IT FOR YOU TO DO YOUR WORK, TAKE CARE OF THINGS AT HOME, OR GET ALONG WITH OTHER PEOPLE: SOMEWHAT DIFFICULT

## 2023-02-14 ASSESSMENT — PAIN SCALES - GENERAL: PAINLEVEL: NO PAIN (0)

## 2023-02-14 NOTE — PROGRESS NOTES
Assessment & Plan     (Z00.00) Encounter for routine adult health examination without abnormal findings  (primary encounter diagnosis)  Comment:   Plan: up to date on PAP; vaccines updated    (Z11.3) Screening for STDs (sexually transmitted diseases)  Comment:   Plan: NEISSERIA GONORRHOEA PCR, CHLAMYDIA TRACHOMATIS        PCR, Chlamydia trachomatis PCR - Clinic         Collect, Neisseria gonorrhoeae PCR - Clinic         Collect, CANCELED: NEISSERIA GONORRHOEA PCR,         CANCELED: CHLAMYDIA TRACHOMATIS PCR            (F41.9) Anxiety  Comment: seeing a counselor. Recommended to get testing for ADHD  Plan: Adult Mental Health  Referral            (R41.840) Concentration deficit  Comment: referrals placed  Plan: Adult Mental Health  Referral            (Z30.44) Encounter for surveillance of vaginal ring hormonal contraceptive device  Comment:   Plan: etonogestrel-ethinyl estradiol (NUVARING)         0.12-0.015 MG/24HR vaginal ring      (L70.9) Acne, unspecified acne type  Comment: works well for acne  Plan: tretinoin (RETIN-A) 0.05 % external cream            (F32.5) Major depression in remission (H)  Comment:   Plan: stable. In remission. Seeing a counselor          Return in about 1 year (around 2/14/2024) for Physical Exam.    Cindy Abbasi PA-C  [facility] RODRICK Tamayo is a 23 year old, presenting for the following health issues:  Physical (Therapist would like her to get A.D.H.D. )      Healthy Habits:     Getting at least 3 servings of Calcium per day:  Yes    Bi-annual eye exam:  NO    Dental care twice a year:  NO    Sleep apnea or symptoms of sleep apnea:  None    Diet:  Regular (no restrictions)    Frequency of exercise:  1 day/week    Duration of exercise:  15-30 minutes    Taking medications regularly:  Yes    Medication side effects:  None    PHQ-2 Total Score: 1    Additional concerns today:  Yes     Working with a counselor on her anxiety and  "depression  Depression better - still struggles with anxiety  Puts a lot of pressure on herself to perform well    Doesn't know if anyone in her family has ADHD  Did well in school because \"school was her thing\" - if she didn't do well, she would get very upset with herself  In an associates degree right now for graphic design - struggling at times on focus/concentration    Also got a message from her insurance that [facility] may not cover the nuva ring or retin stating she might need to get approval  Does not need either right now but wanted to mention it    Review of Systems   Constitutional: Negative for chills and fever.   HENT: Negative for congestion, ear pain, hearing loss and sore throat.    Eyes: Negative for pain and visual disturbance.   Respiratory: Negative for cough and shortness of breath.    Cardiovascular: Negative for chest pain, palpitations and peripheral edema.   Gastrointestinal: Negative for abdominal pain, constipation, diarrhea, heartburn, hematochezia and nausea.   Breasts:  Negative for tenderness, breast mass and discharge.   Genitourinary: Negative for dysuria, frequency, genital sores, hematuria, pelvic pain, urgency, vaginal bleeding and vaginal discharge.   Musculoskeletal: Negative for arthralgias, joint swelling and myalgias.   Skin: Negative for rash.   Neurological: Negative for dizziness, headaches and paresthesias.   Psychiatric/Behavioral: Negative for mood changes. The patient is not nervous/anxious.             Objective    /82 (BP Location: Right arm, Patient Position: Sitting, Cuff Size: Adult Regular)   Pulse 116   Temp 98.8  F (37.1  C) (Tympanic)   Resp 20   Ht 1.588 m (5' 2.5\")   Wt 60.8 kg (134 lb 2 oz)   LMP 02/04/2023 (Approximate)   SpO2 98%   BMI 24.14 kg/m    Body mass index is 24.14 kg/m .  Physical Exam   GENERAL: healthy, alert and no distress  EYES: Eyes grossly normal to inspection, PERRL and conjunctivae and sclerae normal  HENT: ear canals and TM's " normal, nose and mouth without ulcers or lesions  NECK: no adenopathy, no asymmetry, masses, or scars and thyroid normal to palpation  RESP: lungs clear to auscultation - no rales, rhonchi or wheezes  BREAST: normal without masses, tenderness or nipple discharge and no palpable axillary masses or adenopathy  CV: regular rate and rhythm, normal S1 S2, no S3 or S4, no murmur, click or rub, no peripheral edema and peripheral pulses strong  ABDOMEN: soft, nontender, no hepatosplenomegaly, no masses and bowel sounds normal  MS: no gross musculoskeletal defects noted, no edema  SKIN: no suspicious lesions or rashes  NEURO: Normal strength and tone, mentation intact and speech normal  PSYCH: mentation appears normal, affect normal/bright    Diagnostic Tests:   none            Answers for HPI/ROS submitted by the patient on 2/14/2023  If you checked off any problems, how difficult have these problems made it for you to do your work, take care of things at home, or get along with other people?: Somewhat difficult  PHQ9 TOTAL SCORE: 7

## 2023-02-14 NOTE — PATIENT INSTRUCTIONS
For the ADHD assessment:     [facility] will call you to coordinate your care as prescribed by your provider. If you don't hear from a representative within 2 business days, please call [phone].    You can also look into [facility] - they have offices all over the [facility] and sometimes are able to get patient's in more quickly for testing. If you end up getting an appointment through them and they need a referral, please reach out to Dr. Amezquita or myself and we can place that referral for you.

## 2023-02-15 LAB
C TRACH DNA SPEC QL NAA+PROBE: NEGATIVE
N GONORRHOEA DNA SPEC QL NAA+PROBE: NEGATIVE

## 2023-02-24 DIAGNOSIS — L70.9 ACNE, UNSPECIFIED ACNE TYPE: ICD-10-CM

## 2023-02-24 NOTE — TELEPHONE ENCOUNTER
Routing refill request to provider for review/approval because:  PCP to determine refill    Donald Bueno RN

## 2023-02-26 RX ORDER — CLINDAMYCIN PHOSPHATE 10 UG/ML
LOTION TOPICAL
Qty: 60 ML | Refills: 9 | Status: SHIPPED | OUTPATIENT
Start: 2023-02-26 | End: 2024-07-30

## 2023-03-15 ENCOUNTER — VIRTUAL VISIT (OUTPATIENT)
Dept: PSYCHOLOGY | Facility: CLINIC | Age: 23
End: 2023-03-15
Payer: COMMERCIAL

## 2023-03-15 DIAGNOSIS — F41.9 ANXIETY: ICD-10-CM

## 2023-03-15 DIAGNOSIS — R41.840 CONCENTRATION DEFICIT: ICD-10-CM

## 2023-03-15 PROCEDURE — 90834 PSYTX W PT 45 MINUTES: CPT | Mod: VID | Performed by: PSYCHOLOGIST

## 2023-03-15 ASSESSMENT — COLUMBIA-SUICIDE SEVERITY RATING SCALE - C-SSRS
5. HAVE YOU STARTED TO WORK OUT OR WORKED OUT THE DETAILS OF HOW TO KILL YOURSELF? DO YOU INTEND TO CARRY OUT THIS PLAN?: NO
1. IN THE PAST MONTH, HAVE YOU WISHED YOU WERE DEAD OR WISHED YOU COULD GO TO SLEEP AND NOT WAKE UP?: NO
4. HAVE YOU HAD THESE THOUGHTS AND HAD SOME INTENTION OF ACTING ON THEM?: NO
3. HAVE YOU BEEN THINKING ABOUT HOW YOU MIGHT KILL YOURSELF?: NO
6. HAVE YOU EVER DONE ANYTHING, STARTED TO DO ANYTHING, OR PREPARED TO DO ANYTHING TO END YOUR LIFE?: YES
2. HAVE YOU ACTUALLY HAD ANY THOUGHTS OF KILLING YOURSELF IN THE PAST MONTH?: NO

## 2023-03-15 NOTE — PROGRESS NOTES
[facility]   Mental Health & Addiction Services     Disclaimer: Voice recognition software was used to generate this note. As a result, wrong word or 'sound-a-like' substitutions may have occurred due to the inherent limitations of voice recognition software. There may be errors in the script that have gone undetected. Please consider this when interpreting information found in this chart.     ADHD assessment - Initial Visit    Patient  Name:  Maye Fernandez Date: 3/15/23         Service Type: Individual     Visit Start Time: 10:00AM  Visit End Time: 10:45Am    Visit #: 1    Attendees: Client attended alone    Service Modality:  Video Visit:      Provider verified identity through the following two step process.  Patient provided:  Patient     Telemedicine Visit: The patient's condition can be safely assessed and treated via synchronous audio and visual telemedicine encounter.      Reason for Telemedicine Visit: Services only offered telehealth    Originating Site (Patient Location): Patient's home    Distant Site (Provider Location): Provider Remote Setting- Home Office    Consent:  The patient/guardian has verbally consented to: the potential risks and benefits of telemedicine (video visit) versus in person care; bill my insurance or make self-payment for services provided; and responsibility for payment of non-covered services.     Patient would like the video invitation sent by:  My Chart    Mode of Communication:  Video Conference via [facility]    Distant Location (Provider):  Off-site    As the provider I attest to compliance with applicable laws and regulations related to telemedicine.    Provider Location: Off-Site     DATA:   Interactive Complexity: No   Crisis: No     Presenting Concerns/  Current Stressors:   Client presents for session 1 of ADHD assessment.     Reason for doing assessment now: Therapist referred. Mere Wyatt in [facility],  seeing about 18 months. Has been seeing her since she went back to school. [employer], graphic design. Suggested by career counseling. Tends to fall apart when too much is going on.     Previous testing, diagnoses, medication: None. Diagnosed with depression and GERRY previously.     Hospitalizations, suicide attempts, thoughts: Age 14 hospitalized, suicide attempt. Hospitalized for 4 days. Overdose on pain meds.  Was stuck in her head, felt like nobody would get it. Things are much better now. More comfortable talking about MH issues. Has people to talk to.  SIB age 13-16.     Family mental health history: Maternal GM long history of depression and substance abuse.    Grade school/Middle school experience: k-6 awesome, excelled at everything. Middle school harder, depression and anxiety set in. School got a lot harder. Procrastination , Perfectionism, if things weren't perfect she was hard on herself. Math, and science hardest, music. Went to a performing arts school for a semester right before she was hospitalized.     Potential talks: Does not recall. More of a teachers pet. Would do anything to make them like her. Respectful, helpful. Largely kept to herself 3-4 friends.     Hyperactivity, disruptive behavior: Does not recall, terrified of bringing attn to herself.     Legal involvement: Denied    Family CD history: Maternal grandmother. Does not know much about dads side, all live on other side of country.     Current drinking, Cannabis use: Drinks 1-3 5 days per week. About the last 2 years. Rare cannabis infused seltzers. 2x last month.     Does not recall any trauma. Oldest of 2. Younger brother was very outgoing but more closed off now.     ASSESSMENT:  Mental Status Assessment:  Appearance:   Appropriate   Eye Contact:   Good   Psychomotor Behavior: Normal   Attitude:   Cooperative  Interested  Orientation:   All  Speech   Rate / Production: Normal/ Responsive   Volume:  Normal    Mood:    Normal  Affect:    Appropriate   Thought Content:  Clear   Thought Form:  Coherent   Insight:    Good       Safety Issues and Plan for Safety and Risk Management:     [place] Suicide Severity Rating Scale (Short Version)  [place] Suicide Severity Rating (Short Version) 3/15/2023   Q1 Wished to be Dead (Past Month) no   Q2 Suicidal Thoughts (Past Month) no   Q3 Suicidal Thought Method no   Q4 Suicidal Intent without Specific Plan no   Q5 Suicide Intent with Specific Plan no   Q6 Suicide Behavior (Lifetime) yes   Within the Past 3 Months? no   Level of Risk per Screen moderate risk     Patient denies current fears or concerns for personal safety.  Patient denies current or recent suicidal ideation or behaviors.  Patient denies current or recent homicidal ideation or behaviors.  Patient denies current or recent self injurious behavior or ideation.  Patient denies other safety concerns.  Recommended that patient call 911 or go to the local ED should there be a change in any of these risk factors.  Patient reports there are no firearms in the house.     Diagnostic Criteria:  Attention Deficit Hyperactivity Disorder  A) A persistent pattern of inattention and/or hyperactivity-impulsivity that interferes with functioning or development, as characterized by (1) Inattention and/or (2) Hyperactivity and Impulsivity  (1) Inattention: 6 or more of the following symptoms have persisted for at least 6 months to a degree that is inconsistent with developmental level and that negatively impacts directly on social and academic/occupational activities:  - Often fails to give close attention to details or makes careless mistakes in schoolwork, at work, or during other activities  - Often has difficulty sustaining attention in tasks or play activities  - Often does not seem to listen when spoken to directly  - Often does not follow through on instructions and fails to finish schoolwork, chores, or duties in the  workplace  - Often has difficulty organizing tasks and activities  - Often avoids, dislikes, or is reluctant to engage in tasks that require sustained mental effort  - Often loses things necessary for tasks or activities  - Is often easily distractedby extraneous stimuli  - Is often forgetful in daily activities  B) Several inattentive or hyperactive-impulsive symptoms were present prior to age 12 years  C) Several inattentive or hyperactive-impulsive symptoms are present in two or more settings  D) There is clear evidence that the symptoms interfere with, or reduce the quality of, social academic, or occupational functioning  E) The Symptoms do not occur exclusively during the course of schizophrenia or another psychotic disorder and are not better explained by another mental disorder      DSM5 Diagnoses: (Sustained by DSM5 Criteria Listed Above)  Diagnoses: Attention-Deficit/Hyperactivity Disorder  314.01 (F90.2) Combined presentation  Psychosocial & Contextual Factors: Associated anxiety and depression, history of suicide attempt 9 years ago.    Intervention:   Educated on treatment planning and started identifying goals and interventions for treatment plan    Collateral Reports Completed:  Not Applicable      PLAN: (Homework, other):  1. Provider will continue Diagnostic Assessment.  Patient was given the following to do until next session: Complete ADHD rating scales.    2. Provider recommended the following referrals: None.      3.  Suicide Risk and Safety Concerns were assessed for Maye Fernandez.    Patient meets the following risk assessment and triage:   Moderate Risk is identified when the patient has one of the following:  Suicidal behavior more than three months ago ( [place]-SSRS Suicidal Behavior Lifetime) plan suicide attempt was 9 years ago.  She is currently working with a therapist and has a safety plan.    The following has been recommended:  Continue with primary therapist    Safety Plan:  See  above      Carrillo Brooks, PhD  March 15, 2023       normal...

## 2023-03-19 ENCOUNTER — MYC MEDICAL ADVICE (OUTPATIENT)
Dept: FAMILY MEDICINE | Facility: CLINIC | Age: 23
End: 2023-03-19
Payer: COMMERCIAL

## 2023-03-19 DIAGNOSIS — L70.9 ACNE, UNSPECIFIED ACNE TYPE: ICD-10-CM

## 2023-03-20 RX ORDER — SULFACETAMIDE SODIUM, SULFUR 100; 50 MG/G; MG/G
1 EMULSION TOPICAL DAILY
Qty: 227 G | Refills: 1 | Status: SHIPPED | OUTPATIENT
Start: 2023-03-20 | End: 2023-11-08

## 2023-03-20 NOTE — TELEPHONE ENCOUNTER
Routing refill request to provider for review/approval because:  Provider to determine refill    Donald Bueno RN

## 2023-04-04 ENCOUNTER — VIRTUAL VISIT (OUTPATIENT)
Dept: PSYCHOLOGY | Facility: CLINIC | Age: 23
End: 2023-04-04
Payer: COMMERCIAL

## 2023-04-04 DIAGNOSIS — F41.9 ANXIETY: Primary | ICD-10-CM

## 2023-04-04 DIAGNOSIS — F90.0 ADHD (ATTENTION DEFICIT HYPERACTIVITY DISORDER), INATTENTIVE TYPE: ICD-10-CM

## 2023-04-04 PROCEDURE — 90791 PSYCH DIAGNOSTIC EVALUATION: CPT | Mod: VID | Performed by: PSYCHOLOGIST

## 2023-04-04 ASSESSMENT — PATIENT HEALTH QUESTIONNAIRE - PHQ9
SUM OF ALL RESPONSES TO PHQ QUESTIONS 1-9: 6
10. IF YOU CHECKED OFF ANY PROBLEMS, HOW DIFFICULT HAVE THESE PROBLEMS MADE IT FOR YOU TO DO YOUR WORK, TAKE CARE OF THINGS AT HOME, OR GET ALONG WITH OTHER PEOPLE: SOMEWHAT DIFFICULT
SUM OF ALL RESPONSES TO PHQ QUESTIONS 1-9: 6
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
## 2023-04-06 ENCOUNTER — OFFICE VISIT (OUTPATIENT)
Dept: PODIATRY | Facility: CLINIC | Age: 23
End: 2023-04-06
Payer: COMMERCIAL

## 2023-04-06 ENCOUNTER — ANCILLARY PROCEDURE (OUTPATIENT)
Dept: GENERAL RADIOLOGY | Facility: CLINIC | Age: 23
End: 2023-04-06
Attending: PODIATRIST
Payer: COMMERCIAL

## 2023-04-06 VITALS
DIASTOLIC BLOOD PRESSURE: 79 MMHG | WEIGHT: 134 LBS | BODY MASS INDEX: 23.74 KG/M2 | HEART RATE: 82 BPM | HEIGHT: 63 IN | SYSTOLIC BLOOD PRESSURE: 128 MMHG

## 2023-04-06 DIAGNOSIS — S93.402A MODERATE LEFT ANKLE SPRAIN, INITIAL ENCOUNTER: ICD-10-CM

## 2023-04-06 DIAGNOSIS — S93.402A MODERATE LEFT ANKLE SPRAIN, INITIAL ENCOUNTER: Primary | ICD-10-CM

## 2023-04-06 PROCEDURE — 73610 X-RAY EXAM OF ANKLE: CPT | Mod: TC | Performed by: RADIOLOGY

## 2023-04-06 PROCEDURE — 99213 OFFICE O/P EST LOW 20 MIN: CPT | Performed by: PODIATRIST

## 2023-04-06 ASSESSMENT — PAIN SCALES - GENERAL: PAINLEVEL: MILD PAIN (2)

## 2023-04-06 NOTE — PROGRESS NOTES
"PATIENT HISTORY:  Maye Fernandez is a 23 year old female who presents to clinic as a self referral with a chief complaint of left ankle injury.  The patient is seen by themselves.  The patient relates the pain is primarily located around the top and outside of foot.  Patient describes injury as took two steps going down at once and ankle crumbled underneath her that has been going on for 2 week(s).  The patient has previously tried ankle brace, ice, heat, and rest with little relief.  Denies any prior history of similar issues..  The patient is currently employed as .  Any previous notes and studies that pertain to the patient's condition were reviewed.    Pertinent medical, surgical and family history was reviewed in the [facility] chart.    Past Medical History:   Past Medical History:   Diagnosis Date     Deliberate self-cutting        Medications:   Current Outpatient Medications:      clindamycin (CLEOCIN T) 1 % external lotion, APPLY TO FACE TWICE A DAY, Disp: 60 mL, Rfl: 9     etonogestrel-ethinyl estradiol (NUVARING) 0.12-0.015 MG/24HR vaginal ring, Use each ring for 3 weeks and remove for one week.  Then replace with a new ring., Disp: 3 each, Rfl: 3     fluticasone (FLONASE) 50 MCG/ACT nasal spray, Spray 2 sprays into both nostrils daily, Disp: 16 g, Rfl: 1     Sulfacetamide Sodium-Sulfur 10-5 % LIQD, Apply 1 Application. topically daily To face, Disp: 227 g, Rfl: 1     tretinoin (RETIN-A) 0.05 % external cream, Apply topically At Bedtime, Disp: 45 g, Rfl: 11     VENTOLIN  (90 Base) MCG/ACT inhaler, Inhale 2 puffs into the lungs every 6 hours as needed for shortness of breath / dyspnea or wheezing, Disp: 18 g, Rfl: 0     Allergies:  No Known Allergies    Vitals: /79   Pulse 82   Ht 1.588 m (5' 2.5\")   Wt 60.8 kg (134 lb)   LMP 02/04/2023 (Approximate)   BMI 24.12 kg/m    BMI= Body mass index is 24.12 kg/m .    LOWER EXTREMITY PHYSICAL EXAM    Dermatologic: Skin is intact to left " lower extremity without significant lesions, rash or abrasion.        Vascular: DP & PT pulses are intact & regular on the left.   CFT and skin temperature is normal to the left lower extremity.     Neurologic: Lower extremity sensation is intact to light touch.  No evidence of weakness in the left lower extremity.        Musculoskeletal: Patient is ambulatory without assistive device or brace.  No gross ankle deformity noted.  No foot or ankle joint effusion is noted.  Noted pain on palpation over the anterior talofibular ligament of the left ankle.  Noted pain with inversion of the left ankle.  Mild edema around the left ankle.  No pain on palpation of the deltoid ligament on the left.    Diagnostics:  Radiographs included three views of the left ankle demonstrating a stable ankle mortise with no fracture or osteochondral injury noted.  No cortical erosions or periosteal elevation.  All joint margins appear stable.  There is no apparent fracture or tumor formation noted.  There is no evidence of foreign body.  The images were independently reviewed by myself along with the patient explaining the findings.      ASSESSMENT / PLAN:     ICD-10-CM    1. Moderate left ankle sprain, initial encounter  S93.402A XR Ankle Left G/E 3 Views          I have explained to Maye about the conditions.  We discussed the underlying contributing factors to the condition as well as treatment options along with expected length of recovery.  At this time, the patient was instructed to continue offloading the left ankle as well as performing warm soaks with ankle range of motion exercises.  Patient will return in 4 weeks if problems persist.    Maye verbalized agreement with and understanding of the rational for the diagnosis and treatment plan.  All questions were answered to best of my ability and the patient's satisfaction. The patient was advised to contact the clinic with any questions that may arise after the clinic visit.       Disclaimer: This note consists of symbols derived from keyboarding, dictation and/or voice recognition software. As a result, there may be errors in the script that have gone undetected. Please consider this when interpreting information found in this chart.       ADRIANA Amador D.P.M., [email or web address].

## 2023-04-06 NOTE — LETTER
4/6/2023         RE: Maye Fernandez  [id number] [address] [address]        Dear Colleague,    Thank you for referring your patient, Maye Fernandez, to the [facility] [place]. Please see a copy of my visit note below.    PATIENT HISTORY:  Maye Fernandez is a 23 year old female who presents to clinic as a self referral with a chief complaint of left ankle injury.  The patient is seen by themselves.  The patient relates the pain is primarily located around the top and outside of foot.  Patient describes injury as took two steps going down at once and ankle crumbled underneath her that has been going on for 2 week(s).  The patient has previously tried ankle brace, ice, heat, and rest with little relief.  Denies any prior history of similar issues..  The patient is currently employed as .  Any previous notes and studies that pertain to the patient's condition were reviewed.    Pertinent medical, surgical and family history was reviewed in the [facility] chart.    Past Medical History:   Past Medical History:   Diagnosis Date     Deliberate self-cutting        Medications:   Current Outpatient Medications:      clindamycin (CLEOCIN T) 1 % external lotion, APPLY TO FACE TWICE A DAY, Disp: 60 mL, Rfl: 9     etonogestrel-ethinyl estradiol (NUVARING) 0.12-0.015 MG/24HR vaginal ring, Use each ring for 3 weeks and remove for one week.  Then replace with a new ring., Disp: 3 each, Rfl: 3     fluticasone (FLONASE) 50 MCG/ACT nasal spray, Spray 2 sprays into both nostrils daily, Disp: 16 g, Rfl: 1     Sulfacetamide Sodium-Sulfur 10-5 % LIQD, Apply 1 Application. topically daily To face, Disp: 227 g, Rfl: 1     tretinoin (RETIN-A) 0.05 % external cream, Apply topically At Bedtime, Disp: 45 g, Rfl: 11     VENTOLIN  (90 Base) MCG/ACT inhaler, Inhale 2 puffs into the lungs every 6 hours as needed for shortness of breath / dyspnea or wheezing, Disp: 18 g, Rfl: 0     Allergies:   "No Known Allergies    Vitals: /79   Pulse 82   Ht 1.588 m (5' 2.5\")   Wt 60.8 kg (134 lb)   LMP 02/04/2023 (Approximate)   BMI 24.12 kg/m    BMI= Body mass index is 24.12 kg/m .    LOWER EXTREMITY PHYSICAL EXAM    Dermatologic: Skin is intact to left lower extremity without significant lesions, rash or abrasion.        Vascular: DP & PT pulses are intact & regular on the left.   CFT and skin temperature is normal to the left lower extremity.     Neurologic: Lower extremity sensation is intact to light touch.  No evidence of weakness in the left lower extremity.        Musculoskeletal: Patient is ambulatory without assistive device or brace.  No gross ankle deformity noted.  No foot or ankle joint effusion is noted.  Noted pain on palpation over the anterior talofibular ligament of the left ankle.  Noted pain with inversion of the left ankle.  Mild edema around the left ankle.  No pain on palpation of the deltoid ligament on the left.    Diagnostics:  Radiographs included three views of the left ankle demonstrating a stable ankle mortise with no fracture or osteochondral injury noted.  No cortical erosions or periosteal elevation.  All joint margins appear stable.  There is no apparent fracture or tumor formation noted.  There is no evidence of foreign body.  The images were independently reviewed by myself along with the patient explaining the findings.      ASSESSMENT / PLAN:     ICD-10-CM    1. Moderate left ankle sprain, initial encounter  S93.402A XR Ankle Left G/E 3 Views          I have explained to Maye about the conditions.  We discussed the underlying contributing factors to the condition as well as treatment options along with expected length of recovery.  At this time, the patient was instructed to continue offloading the left ankle as well as performing warm soaks with ankle range of motion exercises.  Patient will return in 4 weeks if problems persist.    Maye verbalized agreement " with and understanding of the rational for the diagnosis and treatment plan.  All questions were answered to best of my ability and the patient's satisfaction. The patient was advised to contact the clinic with any questions that may arise after the clinic visit.      Disclaimer: This note consists of symbols derived from keyboarding, dictation and/or voice recognition software. As a result, there may be errors in the script that have gone undetected. Please consider this when interpreting information found in this chart.       SELINA Murphy.P.M., F.A.C.F.A.S.        Again, thank you for allowing me to participate in the care of your patient.        Sincerely,        Carrillo Amador DPM

## 2023-04-06 NOTE — NURSING NOTE
"Chief Complaint   Patient presents with     Consult     Left ankle injury       Initial /79   Pulse 82   Ht 1.588 m (5' 2.5\")   Wt 60.8 kg (134 lb)   LMP 02/04/2023 (Approximate)   BMI 24.12 kg/m   Estimated body mass index is 24.12 kg/m  as calculated from the following:    Height as of this encounter: 1.588 m (5' 2.5\").    Weight as of this encounter: 60.8 kg (134 lb).  Medications and allergies reviewed.      Devika MARKS MA    "

## 2023-05-08 ENCOUNTER — VIRTUAL VISIT (OUTPATIENT)
Dept: PSYCHOLOGY | Facility: CLINIC | Age: 23
End: 2023-05-08
Payer: COMMERCIAL

## 2023-05-08 DIAGNOSIS — F32.5 MAJOR DEPRESSION IN REMISSION (H): Primary | ICD-10-CM

## 2023-05-08 PROCEDURE — 96130 PSYCL TST EVAL PHYS/QHP 1ST: CPT | Mod: VID | Performed by: PSYCHOLOGIST

## 2023-05-08 PROCEDURE — 96131 PSYCL TST EVAL PHYS/QHP EA: CPT | Mod: VID | Performed by: PSYCHOLOGIST

## 2023-05-08 PROCEDURE — 99207 PR NO CHARGE LOS: CPT | Mod: VID | Performed by: PSYCHOLOGIST

## 2023-05-08 ASSESSMENT — PATIENT HEALTH QUESTIONNAIRE - PHQ9
10. IF YOU CHECKED OFF ANY PROBLEMS, HOW DIFFICULT HAVE THESE PROBLEMS MADE IT FOR YOU TO DO YOUR WORK, TAKE CARE OF THINGS AT HOME, OR GET ALONG WITH OTHER PEOPLE: SOMEWHAT DIFFICULT
SUM OF ALL RESPONSES TO PHQ QUESTIONS 1-9: 5
SUM OF ALL RESPONSES TO PHQ QUESTIONS 1-9: 5

## 2023-05-08 NOTE — PROGRESS NOTES
Progress Note  Disclaimer: Voice recognition software was used to generate this note. As a result, wrong word or 'sound-a-like' substitutions may have occurred due to the inherent limitations of voice recognition software. There may be errors in the script that have gone undetected. Please consider this when interpreting information found in this chart.       Client Name: Maye Fernandez  Date: 5/8/2023      Service Type: Individual      Session Start Time: 5:00 PM  session End Time: 5:45 PM     Session Length: 45    Session #: 3    Attendees: Client attended alone    Service Modality:  Video Visit:      Provider verified identity through the following two step process.  Patient provided:  Patient is known previously to provider    Telemedicine Visit: The patient's condition can be safely assessed and treated via synchronous audio and visual telemedicine encounter.      Reason for Telemedicine Visit: Services only offered telehealth    Originating Site (Patient Location): Patient's home    Distant Site (Provider Location): Provider Remote Setting- Home Office    Consent:  The patient/guardian has verbally consented to: the potential risks and benefits of telemedicine (video visit) versus in person care; bill my insurance or make self-payment for services provided; and responsibility for payment of non-covered services.     Patient would like the video invitation sent by:  My Chart    Mode of Communication:  [facility]    Distant Location (Provider):  Off-site    As the provider I attest to compliance with applicable laws and regulations related to telemedicine.     Treatment Plan Last Reviewed: Today  PHQ-9 / GERRY-7 : See Flowsheets    DATA  Interactive Complexity: NO  Crisis: NO            DATA   ADHD Assessment feedback session. Reviewed results of testing. See [facility] extended documentation for results. Explained diagnosis and treatment options.  No  further follow-up necessary at this time.     Progress Since Last Session (Related to Symptoms / Goals / Homework):   Symptoms: No change stable    Homework: Achieved / completed to satisfaction      Episode of Care Goals: Satisfactory progress - ACTION (Actively working towards change); Intervened by reinforcing change plan / affirming steps taken     Current / Ongoing Stressors and Concerns:   Difficulty with attention and concentration     Treatment Objective(s) Addressed in This Session:   Reviewed results of testing, provided ADHD Psychoeducation tips and resources, encouraged client to make appointment for medication evaluation.       Intervention:   psychoeducation regarding ADHD        ASSESSMENT: Current Emotional / Mental Status (status of significant symptoms):   Risk status (Self / Other harm or suicidal ideation)   Client denies current fears or concerns for personal safety.   Client denies current or recent suicidal ideation or behaviors.   Client denies current or recent homicidal ideation or behaviors.   Client denies current or recent self injurious behavior or ideation.   Client denies other safety concerns.   Recommended that patient call 911 or go to the local ED should there be a change in any of these risk factors.     Appearance:   Appropriate    Eye Contact:   Good    Psychomotor Behavior: Normal    Attitude:   Cooperative    Orientation:   All   Speech    Rate / Production: Normal     Volume:  Normal    Mood:    Normal   Affect:    Appropriate    Thought Content:  Clear    Thought Form:  Coherent  Logical    Insight:    Good      Medication Review:   No current psychiatric medications prescribed     Medication Compliance:   NA     Changes in Health Issues:   None reported     Chemical Use Review:   Substance Use: Chemical use reviewed, no active concerns identified      Tobacco Use: No current tobacco use.       Collateral Reports Completed:   Not Applicable    PLAN: (Client Tasks /  Therapist Tasks / Other)  See [facility] Extended Documentation for testing report.         Carrillo Brooks    Psychological Testing   Billing/Services Summary       Initial interview/Record Review 3/15/2023 75905    Intake for 4/4/2023 11313    Interactive feedback Session 5/8/2023 [id number]    Testing Evaluation Services Base: 00242  (1st 60 mins) Add-on: 61224  (each addtl 60 mins)   Test Scoring and Interpretation  (Start/Stop), (Date, Day #) 60 minutes   Integration/Report Generation   (Start/Stop), (Date, Day #) 120 minutes   Clinical Decision Making/Battery Modification   (Start/Stop), (Date, Day #) 0 minutes   Post-Service Work   (Start/Stop), (Date, Day #) 0 minutes   Total Time: 180 minutes (3 hours, 00 minutes)   Total Units:              Diagnosis(es): (ICD-10)  F32.5        30mn No edivence for ADHD. Rec: Continue indiv therapy for anxiety and reduce alcohol consumption by 30-50%.  Answers for HPI/ROS submitted by the patient on 5/8/2023  If you checked off any problems, how difficult have these problems made it for you to do your work, take care of things at home, or get along with other people?: Somewhat difficult  PHQ9 TOTAL SCORE: 5

## 2023-05-15 ENCOUNTER — FCC EXTENDED DOCUMENTATION (OUTPATIENT)
Dept: PSYCHOLOGY | Facility: CLINIC | Age: 23
End: 2023-05-15
Payer: COMMERCIAL

## 2023-05-15 NOTE — PROGRESS NOTES
[facility]  ADHD Evaluation    This note consists of symbols derived from keyboarding, dictation and/or voice recognition software. As a result, there may be errors in the script that have gone undetected. Please consider this when interpreting information found in this chart.     Patient: Maye Fernandez  YOB: 2000  MRN: [id number]  Date(s) of assessment:  Initial Appointment 3/15/23; Diagnostic Assessment 4/4/23; Testing materials interpreted  5/8/23; Feedback Session 5/8/23.    Information about appointment:  Client attended three sessions to aid in determining client's mental health diagnosis or diagnoses and treatment recommendations that best address client concerns. There were no missed or rescheduled sessions.Client records including medical were reviewed. A diagnostic assessment was conducted at the initial appointment. Client completed several rating scales to assist in assessing attention-related and other mental health symptoms that may be causing impairments in functioning. Rating scales were also completed by a collateral contact.    Assessment tools:      Wild Adult ADHD Rating Scale-IV: Self and Other Reports (BAARS-IV), Wild Functional Impairment Scale: Self and Other Reports (BFIS), Wild Deficits in Executive Functioning Scale: Self and Other Reports (BDEFS), Patient Health Questionnaire-9 (PHQ-9) and Generalized Anxiety Disorder-7 (GERRY-7)  CNS Vital Signs Neurocognitive assessment    Assessment Results:    History  Client Reports:  Reason for doing assessment now: Therapist referred. Mere Edmundo in [facility], seeing about 18 months. Has been seeing her since she went back to school. [employer], graphic design. Suggested by career counseling. Tends to fall apart when too much is going on.      Previous testing, diagnoses, medication: None. Diagnosed with depression and GERRY previously.      Hospitalizations, suicide  attempts, thoughts: Age 14 hospitalized, suicide attempt. Hospitalized for 4 days. Overdose on pain meds.  Was stuck in her head, felt like nobody would get it. Things are much better now. More comfortable talking about MH issues. Has people to talk to.  SIB age 13-16.      Family mental health history: Maternal GM long history of depression and substance abuse.     Grade school/Middle school experience: k-6 awesome, excelled at everything. Middle school harder, depression and anxiety set in. School got a lot harder. Procrastination , Perfectionism, if things weren't perfect she was hard on herself. Math, and science hardest, music. Went to a [employer] for a semester right before she was hospitalized.      Potential talks: Does not recall. More of a teachers pet. Would do anything to make them like her. Respectful, helpful. Largely kept to herself 3-4 friends.      Hyperactivity, disruptive behavior: Does not recall, terrified of bringing attn to herself.      Legal involvement: Denied     Family CD history: Maternal grandmother. Does not know much about dads side, all live on other side of country.      Current drinking, Cannabis use: Drinks 1-3 5 days per week. About the last 2 years. Rare cannabis infused seltzers. 2x last month.      Does not recall any trauma. Oldest of 2. Younger brother was very outgoing but more closed off now.        Wild Adult ADHD Rating Scale-IV: Self and Other Reports (BAARS-IV)  The BAARS-IV assesses for symptoms of ADHD that are experienced in one's daily life. This assessment measure includes self and collateral rating scales designed to provide information regarding current and childhood symptoms of ADHD including inattention, hyperactivity, and impulsivity. Self-report scores are reported as percentiles. Scores at the 76th-83rd percentile are considered marginal, scores at the 84th-92nd percentile are considered borderline, scores at the 93rd-95th percentile are  "considered mild, scores at the 96th-98th percentile are considered moderate, and those at the 99th percentile are considered severe. Collateral or \"other\" rating scales are reported as number of symptoms observed in comparison to those reported by the client. Norms and percentile scores are not available for collateral reports.     Current Symptoms Scale--Self Report:   Client completed the self-report inventory of current symptoms.     BAARS-IV SR Raw Score %tile     %tile   Inattention Total  22 95  Symptom Count 4 95   Hyperactivity Total 9 79  Symptom Count 1    Impulsivity Total 7 81  Symptom Count 0 80   Sluggish Cog Tempo 20 87  Symptom Count 3 88   Total ADHD Score 38 92  Total ADHD SX Count 5 91           Client indicated that the reported symptoms have resulted in impaired functioning in school, home and social relationships. Overall, the results suggest the client is experiencing Borderline ADHD symptoms.     Current Symptoms Scale--Other Report:  Client's Significant other completed the collateral report inventory of current symptoms.     BAARS-IV OR Raw Score     Inattention Total  14 Symptom Count 1   Hyperactivity Total 10 Symptom Count 2   Impulsivity Total 5 Symptom Count 0   Sluggish Cog Tempo 16 Symptom Count 3   Total ADHD Score 29 Total ADHD SX Count 3           The collateral contact indicated the client demonstrates impaired functioning in school, home and work} The collateral- and self-report scores are not significantly different.    Childhood Symptoms Scale--Self-Report:  Client completed the self-report inventory of childhood symptoms.     BAARS-IV Child SR Raw Score %tile     %tile   Inattention Total  11 75  Symptom Count 0 75   Impulsivity Total 11 50  Symptom Count 0 75   Total Score 22 50        Symptom Count 0 75              Client indicated that the reported symptoms resulted in impaired functioning in school, home and social relationships Overall, the results suggest the client " "experienced  Average symptoms of ADHD as a child.     Childhood Symptoms Scale--Other Report:  Client's Mother  completed the collateral report inventory of childhood symptoms.     BAARS-IV Child OR Raw Score    Inattention Total  15 Symptom Count 1   Impulsivity Total 12 Symptom Count 0   Total Score  27     Symptom Count 1           Based on the collateral contact's recollection of client's childhood symptoms. The collateral contact indicated the client demonstrates impaired functioning in school, home and social relationshipsThe collateral- and self-report scores are not significantly different.                          Wild Functional Impairment Scale: Self and Other Reports (BFIS)  The BFIS is used to assess an individuals' psychosocial impairment in major life/daily activities that may be due to a mental health disorder. This assessment measure includes self and collateral rating scales. Self-report scores are reported as percentiles. Scores at the 76th-83rd percentile are considered marginal, scores at the 84th-92nd percentile are considered borderline, scores at the 93rd-95th percentile are considered mild, scores at the 96th-98th percentile are considered moderate, and those at the 99th percentile are considered severe.Collateral or \"other\" rating scales are reported as number of symptoms observed in comparison to those reported by the client. Norms and percentile scores are not available for collateral reports.     Results indicate the client identified impairment (scores at or greater than 93rd percentile) in the following areas: None The client's Mean Impairment Score was 2.38 (75th percentile) indicating the client is reporting nonsignificant impairment in functioning across domains. Client's Significant other  completed the collateral rating scale, which indicated similar results.      Wild Deficits in Executive Functioning Scale (BDEFS)  The BDEFS is a measure used for evaluating dimensions of " "adult executive functioning in daily life.This assessment measure includes self and collateral rating scales. Self-report scores are reported as percentiles. Scores at the 76th-83rd percentile are considered marginal, scores at the 84th-92nd percentile are considered borderline, scores at the 93rd-95th percentile are considered mild, scores at the 96th-98th percentile are considered moderate, and those at the 99th percentile are considered severe.Collateral or \"other\" rating scales are reported as number of symptoms observed in comparison to those reported by the client. Norms and percentile scores are not available for collateral reports.     BDEFS-LF SR Raw Score %tile   Time Management Section 1 Total  59 97   Org/problem solving Section 2 Total 59 96   Self Restraint Section 3 Total 33 75   Self Motivation Section 4 Total 26 94   Emotional regulation Section 5 Total 20 75   Total EF Summary Score 197 92   EF Symptom Count 33    ADHD-EF Index Score 25 92         These scores suggest the client has Borderline deficits in executive functioning. These deficits are not likely to be due to ADHD.     Client's Significant other  completed the collateral rating scale, which indicated similar results. The collateral contact's scores were generally higher than the client's report.    Neurocognitive testing (CNSVS)  CNS Vital Signs Neurocognitive Battery  The CNS Vital Signs Neurocognitive Battery is a remotely-administered assessment comprised of seven core subtests to individually measure the patient's verbal memory, visual memory, motor speed, psychomotor speed, reaction time, focus, ability to sustain attention and ability to adapt to changing rules and tasks.      Above average domain scores indicate a standard score (SS) greater than 109 or a Percentile Rank (DC) greater than 74, indicating a high functioning test subject. Average is a SS  or DC 25-74, indicating normal function. Low Average is a SS 80-89 or DC " 9-24 indicating a slight deficit or impairment. Below Average is a SS 70-79 or NV 2-8, indicating a moderate level of deficit or impairment. Very Low is a SS less than 70 or a NV less than 2, indicating a deficit and impairment.  Validity Indicator denotes a guideline for representing the possibility of an invalid test or domain score, and can be influenced by patient understanding, effort, or other conditions.      Neurocognitive Index (NCI): Measures an average score derived from the domain scores or a general assessment of the overall neurocognitive status of the patient.   Composite Memory: Measures how well subject can recognize, remember, and retrieve words and geometric figures, and is comprised of the Visual and Verbal Memory domains.   Verbal Memory: Measures how well subject can recognize, remember, and retrieve words.   Visual Memory: Measures how well subject can recognize, remember and retrieve geometric figures.   Psychomotor Speed: Measures how well a subject perceives, attends, responds to complex visual-perceptual information and performs simple fine motor coordination, and is comprised of the Motor Speed and Processing Speed indexes.   Reaction Time: Measures how quickly the subject can react, in milliseconds, to a simple and increasingly complex direction set.   Motor Speed: Measure: Ability to perform simple movements to produce and satisfy an intention towards a manual action and goal.     The patient's results are detailed below:            The following Scales are most closely related to ADHD (results in summary section below)    Complex Attention: Measures the ability to track and respond to a variety of stimuli over lengthy periods of time and/or perform complex mental tasks requiring vigilance quickly and accurately.     Cognitive Flexibility: Measures how well subject is able to adapt to rapidly changing and increasingly complex set of directions and/or to manipulate the information.  ".    Processing Speed: Measures how well a subject recognizes and processes information i.e., perceiving, attending/responding to incoming information, motor speed, fine motor coordination, and visual-perceptual ability.     Executive Function: Measures how well a subject recognizes rules, categories, and manages or navigates rapid decision making.     Simple Attention: Measures the ability to track and respond to a single defined stimulus over lengthy periods of time while performing vigilance and response inhibition quickly and accurately to a simple task.     Continuous performance test (CPT):  The CPT measures sustained attention or vigilance and choice reaction time most normal subjects obtained near perfect scores on this test.  A long response time may suggest cognitive slowing and or impairment.  More than 2 errors, total, may be clinically significant.  More than 4 errors, total, indicate attentional dysfunction.            Four-part continuous performance test (FP CPT):  This test is a 4 part test that measures a subjects working memory and sustained attention.  Part 1 is a simple reaction time test, the subject must press the spacebar when any stimulus is presented; part two is a variant of a continuous performance test, the subject is asked to respond to 1 stimulus, but not to any others.  Discrimination is required, so the reaction times that are generated are \"choice reaction times\".  Part 3 is a \"one back \"CPT.  The subject has to respond to a figure only if the figure immediately preceding was the same.  Part 4 is a \"2 back \"CPT it is a difficult task and is used to measure working memory.  Parts to 3 and 4 of the tests are used to calculate sustained attention.            Patient Health Questionnaire- 9 (PHQ-9)   This questionnaire is designed to assess for depression in adults.  Based on the score, she is experiencing mild symptoms of depression. Client identified the following symptoms of " depression: lack of interest, difficulty with sleep and poor appetite or overeating.       Generalized Anxiety Disorder Questionnaire (GERRY-7)  This questionnaire is designed to assess for anxiety in adults.  Based on the score, she is experiencing mild symptoms of anxiety. Client identified the following symptoms of anxiety: feeling on edge/nervous/anxious, difficulty controlling worry, worrying about many different things, trouble relaxing and becoming easily annoyed or irritable    V[place]ito Sleepiness Scale:  Self-report measure of how likely the patient is to doze off in eight different situations. Max score is 24. Sufficient sleep is a score of 6 or less. Scores of 7-8 are average. Sleep medicine consult is recommended for scores above 9.          Summary (based on clinical interview, review of records, test results):    Current Symptoms:  Symptom Checklists (Age and Gender Normed)  Inattention:Mild   Hyperactivity:nonsignificant   Impulsivity: nonsignificant   Childhood Inattention:average   Childhood hyperactivity/impulsivity:average  Functional impairment 0/15 Domains. Severity, average  Deficits in Executive Functioning: Borderline     Neurocognitive testing (CNSVS-Computerised)  Complex attention: Average  Cognitive Flexibility: Average  Processing Speed: Average  Executive Function: Average  Simple Attention: Average  Continuous performance task:Average  Four-part continuous performance task:Average    Self-Report  Depression: mild   Anxiety: mild   Sleep Problems:average    ADHD is a neurodevelopmental disorder. As such, to qualify for a diagnosis of ADHD an individual must show some symptoms of the disorder before the age of 12; these symptoms must currently be present to a degree that is inconsistent with their developmental level; the symptoms must negatively impact the individual;  they must be present for more than six months;and  they must occur in multiple areas of the individuals life (e.g. Home  "and school).     Despite some anecdotal evidence, there is no current evidence to support a diagnosis of ADHD.  Client experienced normal difficulties as a child, there is no evidence of executive dysfunction either on self-report or electronic testing.  There is a family history of ADHD but this is in her children.  She does have some problems with organization and problem solving as well as time management and she was given resources to help address these.  She is only reporting mild problems with depression and anxiety.      DSM5 Diagnoses: (Sustained by DSM5 Criteria Listed Above)  Diagnoses: 296.36 (F33.42) Major Depressive Disorder, Recurrent Episode, In full remission _;   Psychosocial & Contextual Factors: Family and work stress    Recommendations:  Access resources through websites, books, and articles such as those provided  in the handout., Get enough sleep, 6-8 hours is typical but some people require more., Consider adding supplemental vitamin D and Fish oil. For mor information see \"[employer] [place] [employer] [place]\" by Jaziel Restrepo. and Get aerobic exercise, ideally 45 minutes or more more days than not.     Carrillo Brooks, PhD    "

## 2023-06-14 NOTE — PATIENT INSTRUCTIONS - HE
I sent Augmentin to your pharmacy  Drink plenty of liquids  You may take Tylenol or ibuprofen as needed  Mucinex/guaifenesin will help thin secretions  Phenylephrine/Sudafed can help with nasal congestion  Please follow-up if not improving   stretcher

## 2023-11-08 ENCOUNTER — VIRTUAL VISIT (OUTPATIENT)
Dept: FAMILY MEDICINE | Facility: CLINIC | Age: 23
End: 2023-11-08
Payer: COMMERCIAL

## 2023-11-08 DIAGNOSIS — J06.9 UPPER RESPIRATORY TRACT INFECTION, UNSPECIFIED TYPE: Primary | ICD-10-CM

## 2023-11-08 PROCEDURE — 99214 OFFICE O/P EST MOD 30 MIN: CPT | Mod: VID | Performed by: FAMILY MEDICINE

## 2023-11-08 ASSESSMENT — PATIENT HEALTH QUESTIONNAIRE - PHQ9
10. IF YOU CHECKED OFF ANY PROBLEMS, HOW DIFFICULT HAVE THESE PROBLEMS MADE IT FOR YOU TO DO YOUR WORK, TAKE CARE OF THINGS AT HOME, OR GET ALONG WITH OTHER PEOPLE: SOMEWHAT DIFFICULT
SUM OF ALL RESPONSES TO PHQ QUESTIONS 1-9: 4
SUM OF ALL RESPONSES TO PHQ QUESTIONS 1-9: 4

## 2023-11-08 NOTE — PROGRESS NOTES
Belkis is a 23 year old who is being evaluated via a billable video visit.      How would you like to obtain your AVS? Akinhart  If the video visit is dropped, the invitation should be resent by: Text to cell phone: [phone]  Will anyone else be joining your video visit? No      -------------------------    Assessment/Plan:    Maye Fernandez is a 23 year old female presenting for:    Upper respiratory tract infection, unspecified type  Reassurance given to the patient that intermittent respiratory illnesses are fairly normal particularly because she gets over them on her own.  Laboratory testing below will be drawn.  Encourage patient to consider a multivitamin as well as potentially a vitamin C supplement around the times that she generally gets ill.    - COMPREHENSIVE METABOLIC PANEL  - CBC with platelets and differential  - TSH with free T4 reflex  - Vitamin D Deficiency  - Hemoglobin A1c        There are no discontinued medications.        Chief Complaint:  Follow Up          Subjective:   Maye Fernandez is a pleasant 23 year old female being evaluated via video visit today for the following concern/s:    Recurrent illness: Patient notes that for the last few years whenever seasons change she becomes a bit ill with an upper respiratory infection.  Generally does not need antibiotics and improved on her own.  She states that this last month she was ill twice.  Once at the beginning of the month for about a week which was fairly mild and then once in the mid to the end of the month which was more severe and took about 2 weeks to get over.  She did swab for COVID the second time which was negative but thinks that maybe she had influenza as she had body aches and a cough.  She is feeling a bit better from that now but was discussing this with her parents who recommended that she get some labs done to ensure that her immune system is strong.    She states that she eats fairly healthfully and tries to  get adequate sleep and physical activity.    She does mention that this often happens at season changes.  She often will take a allergy medication at season changes as her allergies flareup a bit as well.      12 point review of systems completed and negative except for what has been described above    History   Smoking Status    Never   Smokeless Tobacco    Never         Current Outpatient Medications:     clindamycin (CLEOCIN T) 1 % external lotion, APPLY TO FACE TWICE A DAY, Disp: 60 mL, Rfl: 9    etonogestrel-ethinyl estradiol (NUVARING) 0.12-0.015 MG/24HR vaginal ring, Use each ring for 3 weeks and remove for one week.  Then replace with a new ring., Disp: 3 each, Rfl: 3    fluticasone (FLONASE) 50 MCG/ACT nasal spray, Spray 2 sprays into both nostrils daily, Disp: 16 g, Rfl: 1    tretinoin (RETIN-A) 0.05 % external cream, Apply topically At Bedtime, Disp: 45 g, Rfl: 11    VENTOLIN  (90 Base) MCG/ACT inhaler, Inhale 2 puffs into the lungs every 6 hours as needed for shortness of breath / dyspnea or wheezing, Disp: 18 g, Rfl: 0    Sulfacetamide Sodium-Sulfur 10-5 % LIQD, Apply 1 Application. topically daily To face (Patient not taking: Reported on 11/8/2023), Disp: 227 g, Rfl: 1        Objective:  No vitals were done due to the nature of this visit       No data to display                        General: No acute distress  Psych: Appropriate affect  HEENT: moist mucous membranes  Pulmonary: Breathing comfortably, speaking in complete sentences  Extremities: warm and well perfused with no edema  Skin: warm and dry with no rash       This note has been dictated and transcribed using voice recognition software.   Any errors in transcription are unintentional and inherent to the software.      Video-Visit Details    Type of service:  Video Visit   Video Start Time: 1252pm  Video End Time: 118pm    Originating Location (pt. Location): Home    Distant Location (provider location):  On-site  Platform used for  Video Visit: Patrick    Answers submitted by the patient for this visit:  Patient Health Questionnaire (Submitted on 11/8/2023)  If you checked off any problems, how difficult have these problems made it for you to do your work, take care of things at home, or get along with other people?: Somewhat difficult  PHQ9 TOTAL SCORE: 4  General Questionnaire (Submitted on 11/8/2023)  Chief Complaint: Chronic problems general questions HPI Form  How many servings of fruits and vegetables do you eat daily?: 2-3  On average, how many sweetened beverages do you drink each day (Examples: soda, juice, sweet tea, etc.  Do NOT count diet or artificially sweetened beverages)?: 1  How many minutes a day do you exercise enough to make your heart beat faster?: 9 or less  How many days a week do you exercise enough to make your heart beat faster?: 3 or less  How many days per week do you miss taking your medication?: 0  General Concern (Submitted on 11/8/2023)  Chief Complaint: Chronic problems general questions HPI Form  What is the reason for your visit today?: Frequently sick, wondering if I should come in to get testing done or if it isn't of concern.  When did your symptoms begin?: 1-2 weeks ago  What are your symptoms?: cold and flu symptoms, fatigue, congestion  How would you describe these symptoms?: Moderate  Are your symptoms:: Improving  Have you had these symptoms before?: Yes  Have you tried or received treatment for these symptoms before?: No  Is there anything that makes you feel worse?: No  Is there anything that makes you feel better?: OTC flu and cold medications

## 2023-11-09 ENCOUNTER — LAB (OUTPATIENT)
Dept: LAB | Facility: CLINIC | Age: 23
End: 2023-11-09
Payer: COMMERCIAL

## 2023-11-09 DIAGNOSIS — Z11.59 NEED FOR HEPATITIS C SCREENING TEST: ICD-10-CM

## 2023-11-09 DIAGNOSIS — Z11.3 SCREENING FOR STDS (SEXUALLY TRANSMITTED DISEASES): ICD-10-CM

## 2023-11-09 DIAGNOSIS — Z11.4 SCREENING FOR HIV (HUMAN IMMUNODEFICIENCY VIRUS): Primary | ICD-10-CM

## 2023-11-09 DIAGNOSIS — J06.9 UPPER RESPIRATORY TRACT INFECTION, UNSPECIFIED TYPE: ICD-10-CM

## 2023-11-09 LAB
ALBUMIN SERPL BCG-MCNC: 4.9 G/DL (ref 3.5–5.2)
ALP SERPL-CCNC: 44 U/L (ref 35–104)
ALT SERPL W P-5'-P-CCNC: 17 U/L (ref 0–50)
ANION GAP SERPL CALCULATED.3IONS-SCNC: 14 MMOL/L (ref 7–15)
AST SERPL W P-5'-P-CCNC: 21 U/L (ref 0–45)
BASOPHILS # BLD AUTO: 0 10E3/UL (ref 0–0.2)
BASOPHILS NFR BLD AUTO: 1 %
BILIRUB SERPL-MCNC: 0.2 MG/DL
BUN SERPL-MCNC: 10.9 MG/DL (ref 6–20)
CALCIUM SERPL-MCNC: 9.5 MG/DL (ref 8.6–10)
CHLORIDE SERPL-SCNC: 103 MMOL/L (ref 98–107)
CREAT SERPL-MCNC: 0.66 MG/DL (ref 0.51–0.95)
DEPRECATED HCO3 PLAS-SCNC: 22 MMOL/L (ref 22–29)
EGFRCR SERPLBLD CKD-EPI 2021: >90 ML/MIN/1.73M2
EOSINOPHIL # BLD AUTO: 0 10E3/UL (ref 0–0.7)
EOSINOPHIL NFR BLD AUTO: 1 %
ERYTHROCYTE [DISTWIDTH] IN BLOOD BY AUTOMATED COUNT: 11.7 % (ref 10–15)
GLUCOSE SERPL-MCNC: 100 MG/DL (ref 70–99)
HBA1C MFR BLD: 5.2 % (ref 0–5.6)
HCT VFR BLD AUTO: 41.5 % (ref 35–47)
HGB BLD-MCNC: 14 G/DL (ref 11.7–15.7)
IMM GRANULOCYTES # BLD: 0 10E3/UL
IMM GRANULOCYTES NFR BLD: 0 %
LYMPHOCYTES # BLD AUTO: 2 10E3/UL (ref 0.8–5.3)
LYMPHOCYTES NFR BLD AUTO: 32 %
MCH RBC QN AUTO: 29.9 PG (ref 26.5–33)
MCHC RBC AUTO-ENTMCNC: 33.7 G/DL (ref 31.5–36.5)
MCV RBC AUTO: 89 FL (ref 78–100)
MONOCYTES # BLD AUTO: 0.3 10E3/UL (ref 0–1.3)
MONOCYTES NFR BLD AUTO: 5 %
NEUTROPHILS # BLD AUTO: 3.8 10E3/UL (ref 1.6–8.3)
NEUTROPHILS NFR BLD AUTO: 62 %
PLATELET # BLD AUTO: 433 10E3/UL (ref 150–450)
POTASSIUM SERPL-SCNC: 3.8 MMOL/L (ref 3.4–5.3)
PROT SERPL-MCNC: 8.4 G/DL (ref 6.4–8.3)
RBC # BLD AUTO: 4.69 10E6/UL (ref 3.8–5.2)
SODIUM SERPL-SCNC: 139 MMOL/L (ref 135–145)
TSH SERPL DL<=0.005 MIU/L-ACNC: 1.2 UIU/ML (ref 0.3–4.2)
WBC # BLD AUTO: 6.1 10E3/UL (ref 4–11)

## 2023-11-09 PROCEDURE — 86803 HEPATITIS C AB TEST: CPT

## 2023-11-09 PROCEDURE — 82306 VITAMIN D 25 HYDROXY: CPT

## 2023-11-09 PROCEDURE — 80053 COMPREHEN METABOLIC PANEL: CPT

## 2023-11-09 PROCEDURE — 84443 ASSAY THYROID STIM HORMONE: CPT

## 2023-11-09 PROCEDURE — 83036 HEMOGLOBIN GLYCOSYLATED A1C: CPT

## 2023-11-09 PROCEDURE — 85025 COMPLETE CBC W/AUTO DIFF WBC: CPT

## 2023-11-09 PROCEDURE — 87389 HIV-1 AG W/HIV-1&-2 AB AG IA: CPT

## 2023-11-09 PROCEDURE — 36415 COLL VENOUS BLD VENIPUNCTURE: CPT

## 2023-11-10 LAB
HCV AB SERPL QL IA: NONREACTIVE
HIV 1+2 AB+HIV1 P24 AG SERPL QL IA: NONREACTIVE
VIT D+METAB SERPL-MCNC: 49 NG/ML (ref 20–50)

## 2023-12-21 DIAGNOSIS — Z30.44 ENCOUNTER FOR SURVEILLANCE OF VAGINAL RING HORMONAL CONTRACEPTIVE DEVICE: ICD-10-CM

## 2023-12-21 RX ORDER — ETONOGESTREL AND ETHINYL ESTRADIOL .015; .12 MG/D; MG/D
INSERT, EXTENDED RELEASE VAGINAL
Qty: 3 EACH | Refills: 0 | Status: SHIPPED | OUTPATIENT
Start: 2023-12-21 | End: 2024-03-05

## 2023-12-21 NOTE — TELEPHONE ENCOUNTER
Needs in clinic appointment for further refills.  Prescription approved per [facility] Refill Protocol.  Julie Behrendt RN

## 2024-01-15 ENCOUNTER — PATIENT OUTREACH (OUTPATIENT)
Dept: CARE COORDINATION | Facility: CLINIC | Age: 24
End: 2024-01-15
Payer: COMMERCIAL

## 2024-01-29 ENCOUNTER — PATIENT OUTREACH (OUTPATIENT)
Dept: CARE COORDINATION | Facility: CLINIC | Age: 24
End: 2024-01-29
Payer: COMMERCIAL

## 2024-03-05 ENCOUNTER — OFFICE VISIT (OUTPATIENT)
Dept: FAMILY MEDICINE | Facility: CLINIC | Age: 24
End: 2024-03-05
Payer: COMMERCIAL

## 2024-03-05 VITALS
RESPIRATION RATE: 16 BRPM | HEIGHT: 63 IN | DIASTOLIC BLOOD PRESSURE: 78 MMHG | HEART RATE: 92 BPM | SYSTOLIC BLOOD PRESSURE: 120 MMHG | TEMPERATURE: 99.3 F | BODY MASS INDEX: 24.83 KG/M2 | OXYGEN SATURATION: 98 % | WEIGHT: 140.13 LBS

## 2024-03-05 DIAGNOSIS — Z12.4 CERVICAL CANCER SCREENING: ICD-10-CM

## 2024-03-05 DIAGNOSIS — D22.9 IRRITATED NEVUS: ICD-10-CM

## 2024-03-05 DIAGNOSIS — Z00.00 ROUTINE GENERAL MEDICAL EXAMINATION AT A HEALTH CARE FACILITY: Primary | ICD-10-CM

## 2024-03-05 DIAGNOSIS — R06.02 SHORTNESS OF BREATH: ICD-10-CM

## 2024-03-05 DIAGNOSIS — Z30.44 ENCOUNTER FOR SURVEILLANCE OF VAGINAL RING HORMONAL CONTRACEPTIVE DEVICE: ICD-10-CM

## 2024-03-05 DIAGNOSIS — Z11.3 SCREENING FOR STDS (SEXUALLY TRANSMITTED DISEASES): ICD-10-CM

## 2024-03-05 PROCEDURE — 90471 IMMUNIZATION ADMIN: CPT | Performed by: FAMILY MEDICINE

## 2024-03-05 PROCEDURE — 87591 N.GONORRHOEAE DNA AMP PROB: CPT | Performed by: FAMILY MEDICINE

## 2024-03-05 PROCEDURE — 90651 9VHPV VACCINE 2/3 DOSE IM: CPT | Performed by: FAMILY MEDICINE

## 2024-03-05 PROCEDURE — 99395 PREV VISIT EST AGE 18-39: CPT | Mod: 25 | Performed by: FAMILY MEDICINE

## 2024-03-05 PROCEDURE — G0145 SCR C/V CYTO,THINLAYER,RESCR: HCPCS | Performed by: FAMILY MEDICINE

## 2024-03-05 PROCEDURE — 87491 CHLMYD TRACH DNA AMP PROBE: CPT | Performed by: FAMILY MEDICINE

## 2024-03-05 RX ORDER — ETONOGESTREL AND ETHINYL ESTRADIOL VAGINAL RING .015; .12 MG/D; MG/D
RING VAGINAL
Qty: 3 EACH | Refills: 4 | Status: SHIPPED | OUTPATIENT
Start: 2024-03-05

## 2024-03-05 RX ORDER — ALBUTEROL SULFATE 90 UG/1
2 AEROSOL, METERED RESPIRATORY (INHALATION) EVERY 6 HOURS PRN
Qty: 18 G | Refills: 3 | Status: SHIPPED | OUTPATIENT
Start: 2024-03-05

## 2024-03-05 SDOH — HEALTH STABILITY: PHYSICAL HEALTH: ON AVERAGE, HOW MANY MINUTES DO YOU ENGAGE IN EXERCISE AT THIS LEVEL?: 30 MIN

## 2024-03-05 SDOH — HEALTH STABILITY: PHYSICAL HEALTH: ON AVERAGE, HOW MANY DAYS PER WEEK DO YOU ENGAGE IN MODERATE TO STRENUOUS EXERCISE (LIKE A BRISK WALK)?: 2 DAYS

## 2024-03-05 ASSESSMENT — SOCIAL DETERMINANTS OF HEALTH (SDOH): HOW OFTEN DO YOU GET TOGETHER WITH FRIENDS OR RELATIVES?: ONCE A WEEK

## 2024-03-05 NOTE — PATIENT INSTRUCTIONS
Preventive Care Advice   This is general advice given by our system to help you stay healthy. However, your care team may have specific advice just for you. Please talk to your care team about your preventive care needs.  Nutrition  Eat 5 or more servings of fruits and vegetables each day.  Try wheat bread, brown rice and whole grain pasta (instead of white bread, rice, and pasta).  Get enough calcium and vitamin D. Check the label on foods and aim for 100% of the RDA (recommended daily allowance).  Lifestyle  Exercise at least 150 minutes each week   (30 minutes a day, 5 days a week).  Do muscle strengthening activities 2 days a week. These help control your weight and prevent disease.  No smoking.  Wear sunscreen to prevent skin cancer.  Have a dental exam and cleaning every 6 months.  Yearly exams  See your health care team every year to talk about:  Any changes in your health.  Any medicines your care team has prescribed.  Preventive care, family planning, and ways to prevent chronic diseases.  Shots (vaccines)   HPV shots (up to age 26), if you've never had them before.  Hepatitis B shots (up to age 59), if you've never had them before.  COVID-19 shot: Get this shot when it's due.  Flu shot: Get a flu shot every year.  Tetanus shot: Get a tetanus shot every 10 years.  Pneumococcal, hepatitis A, and RSV shots: Ask your care team if you need these based on your risk.  Shingles shot (for age 50 and up).  General health tests  Diabetes screening:  Starting at age 35, Get screened for diabetes at least every 3 years.  If you are younger than age 35, ask your care team if you should be screened for diabetes.  Cholesterol test: At age 39, start having a cholesterol test every 5 years, or more often if advised.  Bone density scan (DEXA): At age 50, ask your care team if you should have this scan for osteoporosis (brittle bones).  Hepatitis C: Get tested at least once in your life.  STIs (sexually transmitted  infections)  Before age 24: Ask your care team if you should be screened for STIs.  After age 24: Get screened for STIs if you're at risk. You are at risk for STIs (including HIV) if:  You are sexually active with more than one person.  You don't use condoms every time.  You or a partner was diagnosed with a sexually transmitted infection.  If you are at risk for HIV, ask about PrEP medicine to prevent HIV.  Get tested for HIV at least once in your life, whether you are at risk for HIV or not.  Cancer screening tests  Cervical cancer screening: If you have a cervix, begin getting regular cervical cancer screening tests at age 21. Most people who have regular screenings with normal results can stop after age 65. Talk about this with your provider.  Breast cancer scan (mammogram): If you've ever had breasts, begin having regular mammograms starting at age 40. This is a scan to check for breast cancer.  Colon cancer screening: It is important to start screening for colon cancer at age 45.  Have a colonoscopy test every 10 years (or more often if you're at risk) Or, ask your provider about stool tests like a FIT test every year or Cologuard test every 3 years.  To learn more about your testing options, visit: https://[email or web address]/040065.pdf.  For help making a decision, visit: https://bit.ly/ji99483.  Prostate cancer screening test: If you have a prostate and are age 55 to 69, ask your provider if you would benefit from a yearly prostate cancer screening test.  Lung cancer screening: If you are a current or former smoker age 50 to 80, ask your care team if ongoing lung cancer screenings are right for you.  For informational purposes only. Not to replace the advice of your health care provider. Copyright   2023 [employer]. All rights reserved. Clinically reviewed by the [facility] Transitions Program. [employer] 269792 - REV 01/24.    Learning About Stress  What is stress?     Stress is your  body's response to a hard situation. Your body can have a physical, emotional, or mental response. Stress is a fact of life for most people, and it affects everyone differently. What causes stress for you may not be stressful for someone else.  A lot of things can cause stress. You may feel stress when you go on a job interview, take a test, or run a race. This kind of short-term stress is normal and even useful. It can help you if you need to work hard or react quickly. For example, stress can help you finish an important job on time.  Long-term stress is caused by ongoing stressful situations or events. Examples of long-term stress include long-term health problems, ongoing problems at work, or conflicts in your family. Long-term stress can harm your health.  How does stress affect your health?  When you are stressed, your body responds as though you are in danger. It makes hormones that speed up your heart, make you breathe faster, and give you a burst of energy. This is called the fight-or-flight stress response. If the stress is over quickly, your body goes back to normal and no harm is done.  But if stress happens too often or lasts too long, it can have bad effects. Long-term stress can make you more likely to get sick, and it can make symptoms of some diseases worse. If you tense up when you are stressed, you may develop neck, shoulder, or low back pain. Stress is linked to high blood pressure and heart disease.  Stress also harms your emotional health. It can make you ramos, tense, or depressed. Your relationships may suffer, and you may not do well at work or school.  What can you do to manage stress?  You can try these things to help manage stress:   Do something active. Exercise or activity can help reduce stress. Walking is a great way to get started. Even everyday activities such as housecleaning or yard work can help.  Try yoga or craig chi. These techniques combine exercise and meditation. You may need  some training at first to learn them.  Do something you enjoy. For example, listen to music or go to a movie. Practice your hobby or do volunteer work.  Meditate. This can help you relax, because you are not worrying about what happened before or what may happen in the future.  Do guided imagery. Imagine yourself in any setting that helps you feel calm. You can use online videos, books, or a teacher to guide you.  Do breathing exercises. For example:  From a standing position, bend forward from the waist with your knees slightly bent. Let your arms dangle close to the floor.  Breathe in slowly and deeply as you return to a standing position. Roll up slowly and lift your head last.  Hold your breath for just a few seconds in the standing position.  Breathe out slowly and bend forward from the waist.  Let your feelings out. Talk, laugh, cry, and express anger when you need to. Talking with supportive friends or family, a counselor, or a yamile leader about your feelings is a healthy way to relieve stress. Avoid discussing your feelings with people who make you feel worse.  Write. It may help to write about things that are bothering you. This helps you find out how much stress you feel and what is causing it. When you know this, you can find better ways to cope.  What can you do to prevent stress?  You might try some of these things to help prevent stress:  Manage your time. This helps you find time to do the things you want and need to do.  Get enough sleep. Your body recovers from the stresses of the day while you are sleeping.  Get support. Your family, friends, and community can make a difference in how you experience stress.  Limit your news feed. Avoid or limit time on social media or news that may make you feel stressed.  Do something active. Exercise or activity can help reduce stress. Walking is a great way to get started.  Where can you learn more?  Go to https://www.healthwise.net/patiented  Enter N032 in the  "search box to learn more about \"Learning About Stress.\"  Current as of: February 26, 2023               Content Version: 13.8    1632-7540 [employer].   Care instructions adapted under license by your healthcare professional. If you have questions about a medical condition or this instruction, always ask your healthcare professional. [employer] disclaims any warranty or liability for your use of this information.      Substance Use Disorder: Care Instructions  Overview     You can improve your life and health by stopping your use of alcohol or drugs. When you don't drink or use drugs, you may feel and sleep better. You may get along better with your family, friends, and coworkers. There are medicines and programs that can help with substance use disorder.  How can you care for yourself at home?  Here are some ways to help you stay sober and prevent relapse.  If you have been given medicine to help keep you sober or reduce your cravings, be sure to take it exactly as prescribed.  Talk to your doctor about programs that can help you stop using drugs or drinking alcohol.  Do not keep alcohol or drugs in your home.  Plan ahead. Think about what you'll say if other people ask you to drink or use drugs. Try not to spend time with people who drink or use drugs.  Use the time and money spent on drinking or drugs to do something that's important to you.  Preventing a relapse  Have a plan to deal with relapse. Learn to recognize changes in your thinking that lead you to drink or use drugs. Get help before you start to drink or use drugs again.  Try to stay away from situations, friends, or places that may lead you to drink or use drugs.  If you feel the need to drink alcohol or use drugs again, seek help right away. Call a trusted friend or family member. Some people get support from organizations such as Narcotics Anonymous or [employer] or from treatment facilities.  If you relapse, get help " as soon as you can. Some people make a plan with another person that outlines what they want that person to do for them if they relapse. The plan usually includes how to handle the relapse and who to notify in case of relapse.  Don't give up. Remember that a relapse doesn't mean that you have failed. Use the experience to learn the triggers that lead you to drink or use drugs. Then quit again. Recovery is a lifelong process. Many people have several relapses before they are able to quit for good.  Follow-up care is a key part of your treatment and safety. Be sure to make and go to all appointments, and call your doctor if you are having problems. It's also a good idea to know your test results and keep a list of the medicines you take.  When should you call for help?   Call 911  anytime you think you may need emergency care. For example, call if you or someone else:    Has overdosed or has withdrawal signs. Be sure to tell the emergency workers that you are or someone else is using or trying to quit using drugs. Overdose or withdrawal signs may include:  Losing consciousness.  Seizure.  Seeing or hearing things that aren't there (hallucinations).     Is thinking or talking about suicide or harming others.   Where to get help 24 hours a day, 7 days a week   If you or someone you know talks about suicide, self-harm, a mental health crisis, a substance use crisis, or any other kind of emotional distress, get help right away. You can:    Call the Suicide and Crisis Lifeline at 980.     Call 0-235-922-TALK (1-841.241.4834).     Text HOME to 882304 to access the Crisis Text Line.   Consider saving these numbers in your phone.  Go to [employer] for more information or to chat online.  Call your doctor now or seek immediate medical care if:    You are having withdrawal symptoms. These may include nausea or vomiting, sweating, shakiness, and anxiety.   Watch closely for changes in your health, and be sure to contact  "your doctor if:    You have a relapse.     You need more help or support to stop.   Where can you learn more?  Go to https://www.[employer].net/patiented  Enter H573 in the search box to learn more about \"Substance Use Disorder: Care Instructions.\"  Current as of: March 21, 2023               Content Version: 13.8    7771-1909 [employer].   Care instructions adapted under license by your healthcare professional. If you have questions about a medical condition or this instruction, always ask your healthcare professional. [employer] disclaims any warranty or liability for your use of this information.      "

## 2024-03-05 NOTE — PROGRESS NOTES
"Preventive Care Visit  [facility] RODRICK Amezquita MD, Family Medicine  Mar 5, 2024    Assessment & Plan     Routine general medical examination at a health care facility  Overall doing well.  Going to school for [employer] design and working as a  in the meantime.  Will be moving in with her boyfriend of 5 years this August  - HPV9 (GARDASIL 9)  - REVIEW OF HEALTH MAINTENANCE PROTOCOL ORDERS  - PRIMARY CARE FOLLOW-UP SCHEDULING; Future    Cervical cancer screening  - Pap Screen only - recommended age 21 - 24 years    Encounter for surveillance of vaginal ring hormonal contraceptive device  - etonogestrel-ethinyl estradiol (HALOETTE) 0.12-0.015 MG/24HR vaginal ring; INSERT 1 RING VAGINALLY FOR 3 WEEKS AND REMOVE FOR 1 WEEK THEN REPLACE WITH NEW RING    Screening for STDs (sexually transmitted diseases)  - Chlamydia trachomatis/Neisseria gonorrhoeae by PCR - Clinic Collect    Shortness of breath  Refill  - albuterol (VENTOLIN HFA) 108 (90 Base) MCG/ACT inhaler; Inhale 2 puffs into the lungs every 6 hours as needed for shortness of breath or wheezing    Irritated nevus  Irritated nevus on skin.  This is growing.  It appears benign but given the nature of irritation will refer to dermatology.  - Adult Dermatology  Referral; Future              BMI  Estimated body mass index is 25.22 kg/m  as calculated from the following:    Height as of this encounter: 1.588 m (5' 2.5\").    Weight as of this encounter: 63.6 kg (140 lb 2 oz).   Weight management plan: Discussed healthy diet and exercise guidelines    Counseling  Appropriate preventive services were discussed with this patient, including applicable screening as appropriate for fall prevention, nutrition, physical activity, Tobacco-use cessation, weight loss and cognition.  Checklist reviewing preventive services available has been given to the patient.  Reviewed patient's diet, addressing concerns and/or questions.   She is at risk " for lack of exercise and has been provided with information to increase physical activity for the benefit of her well-being.             Daysi Tamayo is a 24 year old, presenting for the following:  Physical (Pap smear and GC swab due ) and Mole (Facial mole- seems like it has been growing over the last couple years)      Health Care Directive  Patient does not have a Health Care Directive or Living Will: Discussed advance care planning with patient; however, patient declined at this time.    RANDALL Tamayo is a very pleasant 24-year-old female who presents to the clinic today for a complete physical.    She is due for a Pap smear.  She is currently using the NuvaRing which is going well.  She would like refills.    She has a mole on her left chin that gets irritated, can bleed very occasionally if she scratches that and seems to be growing slightly.            3/5/2024   General Health   How would you rate your overall physical health? Good   Feel stress (tense, anxious, or unable to sleep) To some extent   (!) STRESS CONCERN      3/5/2024   Nutrition   Three or more servings of calcium each day? Yes   Diet: Regular (no restrictions)   How many servings of fruit and vegetables per day? (!) 2-3   How many sweetened beverages each day? (!) 2         3/5/2024   Exercise   Days per week of moderate/strenous exercise 2 days   Average minutes spent exercising at this level 30 min   (!) EXERCISE CONCERN      3/5/2024   Social Factors   Frequency of gathering with friends or relatives Once a week   Worry food won't last until get money to buy more No   Food not last or not have enough money for food? No   Do you have housing?  Yes   Are you worried about losing your housing? No   Lack of transportation? No   Unable to get utilities (heat,electricity)? No         3/5/2024   Dental   Dentist two times every year? (!) NO         3/5/2024   TB Screening   Were you born outside of [place]?  No         Today's PHQ-9 Score:        "11/8/2023    12:01 PM   PHQ-9 SCORE   PHQ-9 Total Score MyChart 4 (Minimal depression)   PHQ-9 Total Score 4           3/5/2024   Substance Use   Alcohol more than 3/day or more than 7/wk No   Do you use any other substances recreationally? (!) ALCOHOL     Social History     Tobacco Use    Smoking status: Never    Smokeless tobacco: Never   Substance Use Topics    Alcohol use: No           3/5/2024   STI Screening   New sexual partner(s) since last STI/HIV test? No     History of abnormal Pap smear: NO - age 21-29 PAP every 3 years recommended        3/1/2021     1:31 PM   PAP / HPV   PAP (Historical) NIL            3/5/2024   Contraception/Family Planning   Questions about contraception or family planning No        Reviewed and updated as needed this visit by Provider                          Review of Systems  Constitutional, HEENT, cardiovascular, pulmonary, GI, , musculoskeletal, neuro, skin, endocrine and psych systems are negative, except as otherwise noted.     Objective    Exam  /78   Pulse 92   Temp 99.3  F (37.4  C) (Tympanic)   Resp 16   Ht 1.588 m (5' 2.5\")   Wt 63.6 kg (140 lb 2 oz)   LMP 02/23/2024 (Exact Date)   SpO2 98%   BMI 25.22 kg/m     Estimated body mass index is 25.22 kg/m  as calculated from the following:    Height as of this encounter: 1.588 m (5' 2.5\").    Weight as of this encounter: 63.6 kg (140 lb 2 oz).    Physical Exam    Physical Exam:  General Appearance: Alert, cooperative, no distress, appears stated age   Head: Normocephalic, without obvious abnormality, atraumatic  Eyes: PERRL, conjunctiva/corneas clear, EOM's intact   Ears: Normal TM's and external ear canals, both ears  Nose:Nares normal, septum midline,mucosa normal, no drainage    Throat:Lips, mucosa, and tongue normal; teeth and gums normal  Neck: Supple, symmetrical, trachea midline, no adenopathy;  thyroid: not enlarged, symmetric, no tenderness/mass/nodules  Back: Symmetric, no curvature, ROM " normal,  Lungs: Clear to auscultation bilaterally, respirations unlabored  Breasts: No breast masses, tenderness, asymmetry, or nipple discharge.  Heart: Regular rate and rhythm, S1 and S2 normal, no murmur, rub, or gallop  Abdomen: Soft, non-tender, bowel sounds active all four quadrants,  no masses, no organomegaly  Pelvic:normal external female genitalia, normal appearing vaginal mucosa and cervix  Extremities: Extremities normal, atraumatic, no cyanosis or edema  Skin: Skin color, texture, turgor normal, no rashes or lesions, very well-circumscribed brown raised nevus on her left chin  Lymph nodes: Cervical, supraclavicular, and axillary nodes normal and   Neurologic: Normal        Signed Electronically by: Lashonda Amezquita MD

## 2024-03-06 LAB
C TRACH DNA SPEC QL PROBE+SIG AMP: NEGATIVE
N GONORRHOEA DNA SPEC QL NAA+PROBE: NEGATIVE

## 2024-03-08 LAB
BKR LAB AP GYN ADEQUACY: NORMAL
BKR LAB AP GYN INTERPRETATION: NORMAL
BKR LAB AP HPV REFLEX: NO
BKR LAB AP PREVIOUS ABNORMAL: NORMAL
PATH REPORT.COMMENTS IMP SPEC: NORMAL
PATH REPORT.COMMENTS IMP SPEC: NORMAL
PATH REPORT.RELEVANT HX SPEC: NORMAL

## 2024-05-17 ENCOUNTER — OFFICE VISIT (OUTPATIENT)
Dept: DERMATOLOGY | Facility: CLINIC | Age: 24
End: 2024-05-17
Payer: COMMERCIAL

## 2024-05-17 DIAGNOSIS — D48.5 NEOPLASM OF UNCERTAIN BEHAVIOR OF SKIN: ICD-10-CM

## 2024-05-17 DIAGNOSIS — D22.9 MULTIPLE BENIGN NEVI: Primary | ICD-10-CM

## 2024-05-17 DIAGNOSIS — D22.9 IRRITATED NEVUS: ICD-10-CM

## 2024-05-17 PROCEDURE — 11102 TANGNTL BX SKIN SINGLE LES: CPT | Performed by: PHYSICIAN ASSISTANT

## 2024-05-17 PROCEDURE — 11103 TANGNTL BX SKIN EA SEP/ADDL: CPT | Performed by: PHYSICIAN ASSISTANT

## 2024-05-17 PROCEDURE — 88305 TISSUE EXAM BY PATHOLOGIST: CPT | Performed by: PATHOLOGY

## 2024-05-17 PROCEDURE — 99202 OFFICE O/P NEW SF 15 MIN: CPT | Mod: 25 | Performed by: PHYSICIAN ASSISTANT

## 2024-05-17 ASSESSMENT — PAIN SCALES - GENERAL: PAINLEVEL: NO PAIN (0)

## 2024-05-17 NOTE — PATIENT INSTRUCTIONS
Wound Care Instructions     FOR SUPERFICIAL WOUNDS     [facility] [phone]    [facility] [phone]                       AFTER 24 HOURS YOU SHOULD REMOVE THE BANDAGE AND BEGIN DAILY DRESSING CHANGES AS FOLLOWS:     1) Remove Dressing.     2) Clean and dry the area with tap water using a Q-tip or sterile gauze pad.     3) Apply Vaseline, Aquaphor, Polysporin ointment or Bacitracin ointment over entire wound.  Do NOT use Neosporin ointment.     4) Cover the wound with a band-aid, or a sterile non-stick gauze pad and micropore paper tape      REPEAT THESE INSTRUCTIONS AT LEAST ONCE A DAY UNTIL THE WOUND HAS COMPLETELY HEALED.    It is an old wives tale that a wound heals better when it is exposed to air and allowed to dry out. The wound will heal faster with a better cosmetic result if it is kept moist with ointment and covered with a bandage.    **Do not let the wound dry out.**      Supplies Needed:      *Cotton tipped applicators (Q-tips)    *Polysporin Ointment or Bacitracin Ointment (NOT NEOSPORIN)    *Band-aids or non-stick gauze pads and micropore paper tape.      PATIENT INFORMATION:    During the healing process you will notice a number of changes. All wounds develop a small halo of redness surrounding the wound.  This means healing is occurring. Severe itching with extensive redness usually indicates sensitivity to the ointment or bandage tape used to dress the wound.  You should call our office if this develops.      Swelling  and/or discoloration around your surgical site is common, particularly when performed around the eye.    All wounds normally drain.  The larger the wound the more drainage there will be.  After 7-10 days, you will notice the wound beginning to shrink and new skin will begin to grow.  The wound is healed when you can see skin has formed over the entire area.  A healed wound has a healthy, shiny look to the surface and is red to dark pink in color  to normalize.  Wounds may take approximately 4-6 weeks to heal.  Larger wounds may take 6-8 weeks.  After the wound is healed you may discontinue dressing changes.    You may experience a sensation of tightness as your wound heals. This is normal and will gradually subside.    Your healed wound may be sensitive to temperature changes. This sensitivity improves with time, but if you re having a lot of discomfort, try to avoid temperature extremes.    Patients frequently experience itching after their wound appears to have healed because of the continue healing under the skin.  Plain Vaseline will help relieve the itching.        POSSIBLE COMPLICATIONS    BLEEDING:    Leave the bandage in place.  Use tightly rolled up gauze or a cloth to apply direct pressure over the bandage for 30  minutes.  Reapply pressure for an additional 30 minutes if necessary  Use additional gauze and tape to maintain pressure once the bleeding has stopped.       Patient Education       Proper skin care from [facility]:    -Eliminate harsh soaps as they strip the natural oils from the skin, often resulting in dry itchy skin ( i.e. Dial, Zest, Tameka Spring)  -Use mild soaps such as Cetaphil or Dove Sensitive Skin in the shower. You do not need to use soap on arms, legs, and trunk every time you shower unless visibly soiled.   -Avoid hot or cold showers.  -After showering, lightly dry off and apply moisturizing within 2-3 minutes. This will help trap moisture in the skin.   -Aggressive use of a moisturizer at least 1-2 times a day to the entire body (including -Vanicream, Cetaphil, Aquaphor or Cerave) and moisturize hands after every washing.  -We recommend using moisturizers that come in a tub that needs to be scooped out, not a pump. This has more of an oil base. It will hold moisture in your skin much better than a water base moisturizer. The above recommended are non-pore clogging.      Wear a sunscreen with at least SPF 30 on  your face, ears, neck and V of the chest daily. Wear sunscreen on other areas of the body if those areas are exposed to the sun throughout the day. Sunscreens can contain physical and/or chemical blockers. Physical blockers are less likely to clog pores, these include zinc oxide and titanium dioxide. Reapply every two hour and after swimming.     Sunscreen examples: https://www.ewg.org/sunscreen/    UV radiation  UVA radiation remains constant throughout the day and throughout the year. It is a longer wavelength than UVB and therefore penetrates deeper into the skin leading to immediate and delayed tanning, photoaging, and skin cancer. 70-80% of UVA and UVB radiation occurs between the hours of 10am-2pm.  UVB radiation  UVB radiation causes the most harmful effects and is more significant during the summer months. However, snow and ice can reflect UVB radiation leading to skin damage during the winter months as well. UVB radiation is responsible for tanning, burning, inflammation, delayed erythema (pinkness), pigmentation (brown spots), and skin cancer.     I recommend self monthly full body exams and yearly full body exams with a dermatology provider. If you develop a new or changing lesion please follow up for examination. Most skin cancers are pink and scaly or pink and pearly. However, we do see blue/brown/black skin cancers.  Consider the ABCDEs of melanoma when giving yourself your monthly full body exam ( don't forget the groin, buttocks, feet, toes, etc). A-asymmetry, B-borders, C-color, D-diameter, E-elevation or evolving. If you see any of these changes please follow up in clinic. If you cannot see your back I recommend purchasing a hand held mirror to use with a larger wall mirror.       Checking for Skin Cancer  You can find cancer early by checking your skin each month. There are 3 kinds of skin cancer. They are melanoma, basal cell carcinoma, and squamous cell carcinoma. Doing monthly skin checks is  the best way to find new marks or skin changes. Follow the instructions below for checking your skin.   The ABCDEs of checking moles for melanoma   Check your moles or growths for signs of melanoma using ABCDE:   Asymmetry: the sides of the mole or growth don t match  Border: the edges are ragged, notched, or blurred  Color: the color within the mole or growth varies  Diameter: the mole or growth is larger than 6 mm (size of a pencil eraser)  Evolving: the size, shape, or color of the mole or growth is changing (evolving is not shown in the images below)    Checking for other types of skin cancer  Basal cell carcinoma or squamous cell carcinoma have symptoms such as:     A spot or mole that looks different from all other marks on your skin  Changes in how an area feels, such as itching, tenderness, or pain  Changes in the skin's surface, such as oozing, bleeding, or scaliness  A sore that does not heal  New swelling or redness beyond the border of a mole    Who s at risk?  Anyone can get skin cancer. But you are at greater risk if you have:   Fair skin, light-colored hair, or light-colored eyes  Many moles or abnormal moles on your skin  A history of sunburns from sunlight or tanning beds  A family history of skin cancer  A history of exposure to radiation or chemicals  A weakened immune system  If you have had skin cancer in the past, you are at risk for recurring skin cancer.   How to check your skin  Do your monthly skin checkups in front of a full-length mirror. Check all parts of your body, including your:   Head (ears, face, neck, and scalp)  Torso (front, back, and sides)  Arms (tops, undersides, upper, and lower armpits)  Hands (palms, backs, and fingers, including under the nails)  Buttocks and genitals  Legs (front, back, and sides)  Feet (tops, soles, toes, including under the nails, and between toes)  If you have a lot of moles, take digital photos of them each month. Make sure to take photos both up  close and from a distance. These can help you see if any moles change over time.   Most skin changes are not cancer. But if you see any changes in your skin, call your doctor right away. Only he or she can diagnose a problem. If you have skin cancer, seeing your doctor can be the first step toward getting the treatment that could save your life.   [employer] last reviewed this educational content on 4/1/2019 2000-2020 [employer] [address] [place] [address], [place] [address]. All rights reserved. This information is not intended as a substitute for professional medical care. Always follow your healthcare professional's instructions.       When should I call my doctor?  If you are worsening or not improving, please, contact us or seek urgent care as noted below.     Who should I call with questions (adults)?  [facility] (adult and pediatric): [phone]  [facility] (adult): [phone]  [facility] ([facility] and [place]) [phone]  For urgent needs outside of business hours call the [facility] at [phone] and ask for the dermatology resident on call to be paged  If this is a medical emergency and you are unable to reach an ER, Call [phone]      If you need a prescription refill, please contact your pharmacy. Refills are approved or denied by our Physicians during normal business hours, Monday through Fridays  Per office policy, refills will not be granted if you have not been seen within the past year (or sooner depending on your child's condition)

## 2024-05-17 NOTE — PROGRESS NOTES
Maye Fernandez is an extremely pleasant 24 year old year old female patient here today for moles on face, torso. She notes moles will get irritated. No pain or bleeding. She reports moles have grown as she has grown. Patient has no other skin complaints today.  Remainder of the HPI, Meds, PMH, Allergies, FH, and SH was reviewed in chart.    Pertinent Hx:   No personal history of skin cancer.   Past Medical History:   Diagnosis Date    Deliberate self-cutting        History reviewed. No pertinent surgical history.     History reviewed. No pertinent family history.    Social History     Socioeconomic History    Marital status: Single     Spouse name: Not on file    Number of children: Not on file    Years of education: Not on file    Highest education level: Not on file   Occupational History    Not on file   Tobacco Use    Smoking status: Never    Smokeless tobacco: Never   Substance and Sexual Activity    Alcohol use: No    Drug use: Not on file    Sexual activity: Not on file   Other Topics Concern    Not on file   Social History Narrative    Not on file     Social Determinants of Health     Financial Resource Strain: Low Risk  (3/5/2024)    Financial Resource Strain     Within the past 12 months, have you or your family members you live with been unable to get utilities (heat, electricity) when it was really needed?: No   Food Insecurity: Low Risk  (3/5/2024)    Food Insecurity     Within the past 12 months, did you worry that your food would run out before you got money to buy more?: No     Within the past 12 months, did the food you bought just not last and you didn t have money to get more?: No   Transportation Needs: Low Risk  (3/5/2024)    Transportation Needs     Within the past 12 months, has lack of transportation kept you from medical appointments, getting your medicines, non-medical meetings or appointments, work, or from getting things that you need?: No   Physical Activity: Insufficiently Active  (3/5/2024)    Exercise Vital Sign     Days of Exercise per Week: 2 days     Minutes of Exercise per Session: 30 min   Stress: Stress Concern Present (3/5/2024)    [place] [employer] - Occupational Stress Questionnaire     Feeling of Stress : To some extent   Social Connections: Unknown (3/5/2024)    Social Connection and Isolation Panel [NHANES]     Frequency of Communication with Friends and Family: Not on file     Frequency of Social Gatherings with Friends and Family: Once a week     Attends Mormon Services: Not on file     Active Member of Clubs or Organizations: Not on file     Attends Club or Organization Meetings: Not on file     Marital Status: Not on file   Interpersonal Safety: Low Risk  (3/5/2024)    Interpersonal Safety     Do you feel physically and emotionally safe where you currently live?: Yes     Within the past 12 months, have you been hit, slapped, kicked or otherwise physically hurt by someone?: No     Within the past 12 months, have you been humiliated or emotionally abused in other ways by your partner or ex-partner?: No   Housing Stability: Low Risk  (3/5/2024)    Housing Stability     Do you have housing? : Yes     Are you worried about losing your housing?: No       Outpatient Encounter Medications as of 5/17/2024   Medication Sig Dispense Refill    albuterol (VENTOLIN HFA) 108 (90 Base) MCG/ACT inhaler Inhale 2 puffs into the lungs every 6 hours as needed for shortness of breath or wheezing 18 g 3    clindamycin (CLEOCIN T) 1 % external lotion APPLY TO FACE TWICE A DAY 60 mL 9    etonogestrel-ethinyl estradiol (HALOETTE) 0.12-0.015 MG/24HR vaginal ring INSERT 1 RING VAGINALLY FOR 3 WEEKS AND REMOVE FOR 1 WEEK THEN REPLACE WITH NEW RING 3 each 4    fluticasone (FLONASE) 50 MCG/ACT nasal spray Spray 2 sprays into both nostrils daily 16 g 1    tretinoin (RETIN-A) 0.05 % external cream Apply topically At Bedtime 45 g 11     No facility-administered encounter medications  on file as of 5/17/2024.             O:   NAD, WDWN, Alert & Oriented, Mood & Affect wnl, Vitals stable   Here today alone   There were no vitals taken for this visit.   General appearance normal   Vitals stable   Alert, oriented and in no acute distress      Brown papules and macules with regular pigment network and borders on torso and extremities    0.3 cm brown papule on right lateral mid back   0.5 cm brown papule on left lateral mid back      Eyes: Conjunctivae/lids:Normal     ENT: Lips, mucosa: normal    MSK:Normal    Pulm: Breathing Normal    Neuro/Psych: Orientation:Alert and Orientedx3 ; Mood/Affect:normal   A/P:  1. R/O inflamed nevus on right lateral mid back and left lateral mid back   TANGENTIAL BIOPSY SENT OUT:  After consent, anesthesia with LEC and prep, tangential excision performed and specimen sent out for permanent section histology.  No complications and routine wound care. Patient told to call our office in 1-2 weeks for result.      2. 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 She will think about removal, will send to Dr. Stinson if wanting excision.   It was a pleasure speaking to Maye Fernandez today.  BENIGN LESIONS DISCUSSED WITH PATIENT:  I discussed the specifics of tumor, prognosis, and genetics of benign lesions.  I explained that treatment of these lesions would be purely cosmetic and not medically neccessary.  I discussed with patient different removal options including excision, cautery and /or laser.      Nature and genetics of benign skin lesions dicussed with patient.  Signs and Symptoms of skin cancer discussed with patient.  ABCDEs of melanoma reviewed with patient.  Patient encouraged to perform monthly skin exams.  UV precautions reviewed with patient.  Risks of non-melanoma skin cancer discussed with patient   Return to clinic as needed.

## 2024-05-17 NOTE — LETTER
5/17/2024         RE: Maye Fernandez  [address] [facility] [address]        Dear Colleague,    Thank you for referring your patient, Maye Fernandez, to the [facility]. Please see a copy of my visit note below.    Maye Fernandez is an extremely pleasant 24 year old year old female patient here today for moles on face, torso. She notes moles will get irritated. No pain or bleeding. She reports moles have grown as she has grown. Patient has no other skin complaints today.  Remainder of the HPI, Meds, PMH, Allergies, FH, and SH was reviewed in chart.    Pertinent Hx:   No personal history of skin cancer.   Past Medical History:   Diagnosis Date     Deliberate self-cutting        History reviewed. No pertinent surgical history.     History reviewed. No pertinent family history.    Social History     Socioeconomic History     Marital status: Single     Spouse name: Not on file     Number of children: Not on file     Years of education: Not on file     Highest education level: Not on file   Occupational History     Not on file   Tobacco Use     Smoking status: Never     Smokeless tobacco: Never   Substance and Sexual Activity     Alcohol use: No     Drug use: Not on file     Sexual activity: Not on file   Other Topics Concern     Not on file   Social History Narrative     Not on file     Social Determinants of Health     Financial Resource Strain: Low Risk  (3/5/2024)    Financial Resource Strain      Within the past 12 months, have you or your family members you live with been unable to get utilities (heat, electricity) when it was really needed?: No   Food Insecurity: Low Risk  (3/5/2024)    Food Insecurity      Within the past 12 months, did you worry that your food would run out before you got money to buy more?: No      Within the past 12 months, did the food you bought just not last and you didn t have money to get more?: No   Transportation Needs: Low Risk  (3/5/2024)     Transportation Needs      Within the past 12 months, has lack of transportation kept you from medical appointments, getting your medicines, non-medical meetings or appointments, work, or from getting things that you need?: No   Physical Activity: Insufficiently Active (3/5/2024)    Exercise Vital Sign      Days of Exercise per Week: 2 days      Minutes of Exercise per Session: 30 min   Stress: Stress Concern Present (3/5/2024)    [employer] [place] of [employer]ational [employer] - Occupational Stress Questionnaire      Feeling of Stress : To some extent   Social Connections: Unknown (3/5/2024)    Social Connection and Isolation Panel [NHANES]      Frequency of Communication with Friends and Family: Not on file      Frequency of Social Gatherings with Friends and Family: Once a week      Attends Bahai Services: Not on file      Active Member of Clubs or Organizations: Not on file      Attends Club or Organization Meetings: Not on file      Marital Status: Not on file   Interpersonal Safety: Low Risk  (3/5/2024)    Interpersonal Safety      Do you feel physically and emotionally safe where you currently live?: Yes      Within the past 12 months, have you been hit, slapped, kicked or otherwise physically hurt by someone?: No      Within the past 12 months, have you been humiliated or emotionally abused in other ways by your partner or ex-partner?: No   Housing Stability: Low Risk  (3/5/2024)    Housing Stability      Do you have housing? : Yes      Are you worried about losing your housing?: No       Outpatient Encounter Medications as of 5/17/2024   Medication Sig Dispense Refill     albuterol (VENTOLIN HFA) 108 (90 Base) MCG/ACT inhaler Inhale 2 puffs into the lungs every 6 hours as needed for shortness of breath or wheezing 18 g 3     clindamycin (CLEOCIN T) 1 % external lotion APPLY TO FACE TWICE A DAY 60 mL 9     etonogestrel-ethinyl estradiol (HALOETTE) 0.12-0.015 MG/24HR vaginal ring INSERT 1 RING VAGINALLY FOR 3  WEEKS AND REMOVE FOR 1 WEEK THEN REPLACE WITH NEW RING 3 each 4     fluticasone (FLONASE) 50 MCG/ACT nasal spray Spray 2 sprays into both nostrils daily 16 g 1     tretinoin (RETIN-A) 0.05 % external cream Apply topically At Bedtime 45 g 11     No facility-administered encounter medications on file as of 5/17/2024.             O:   NAD, WDWN, Alert & Oriented, Mood & Affect wnl, Vitals stable   Here today alone   There were no vitals taken for this visit.   General appearance normal   Vitals stable   Alert, oriented and in no acute distress      Brown papules and macules with regular pigment network and borders on torso and extremities    0.3 cm brown papule on right lateral mid back   0.5 cm brown papule on left lateral mid back      Eyes: Conjunctivae/lids:Normal     ENT: Lips, mucosa: normal    MSK:Normal    Pulm: Breathing Normal    Neuro/Psych: Orientation:Alert and Orientedx3 ; Mood/Affect:normal   A/P:  1. R/O inflamed nevus on right lateral mid back and left lateral mid back   TANGENTIAL BIOPSY SENT OUT:  After consent, anesthesia with LEC and prep, tangential excision performed and specimen sent out for permanent section histology.  No complications and routine wound care. Patient told to call our office in 1-2 weeks for result.      2. 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 She will think about removal, will send to Dr. Stinson if wanting excision.   It was a pleasure speaking to Maye Fernandez today.  BENIGN LESIONS DISCUSSED WITH PATIENT:  I discussed the specifics of tumor, prognosis, and genetics of benign lesions.  I explained that treatment of these lesions would be purely cosmetic and not medically neccessary.  I discussed with patient different removal options including excision, cautery and /or laser.      Nature and genetics of benign skin lesions dicussed with patient.  Signs and Symptoms of  skin cancer discussed with patient.  ABCDEs of melanoma reviewed with patient.  Patient encouraged to perform monthly skin exams.  UV precautions reviewed with patient.  Risks of non-melanoma skin cancer discussed with patient   Return to clinic as needed.       Again, thank you for allowing me to participate in the care of your patient.        Sincerely,        Marcie Lima PA-C

## 2024-05-21 LAB
PATH REPORT.COMMENTS IMP SPEC: NORMAL
PATH REPORT.COMMENTS IMP SPEC: NORMAL
PATH REPORT.FINAL DX SPEC: NORMAL
PATH REPORT.GROSS SPEC: NORMAL
PATH REPORT.MICROSCOPIC SPEC OTHER STN: NORMAL
PATH REPORT.RELEVANT HX SPEC: NORMAL

## 2024-07-29 DIAGNOSIS — L70.9 ACNE, UNSPECIFIED ACNE TYPE: ICD-10-CM

## 2024-07-30 RX ORDER — TRETINOIN 0.5 MG/G
CREAM TOPICAL
Qty: 40 G | Refills: 1 | Status: SHIPPED | OUTPATIENT
Start: 2024-07-30

## 2024-07-30 RX ORDER — CLINDAMYCIN PHOSPHATE 10 UG/ML
LOTION TOPICAL
Qty: 60 ML | Refills: 1 | Status: SHIPPED | OUTPATIENT
Start: 2024-07-30